# Patient Record
Sex: FEMALE | Race: WHITE | HISPANIC OR LATINO | Employment: PART TIME | URBAN - METROPOLITAN AREA
[De-identification: names, ages, dates, MRNs, and addresses within clinical notes are randomized per-mention and may not be internally consistent; named-entity substitution may affect disease eponyms.]

---
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
## 2021-02-02 DIAGNOSIS — Z30.9 ENCOUNTER FOR CONTRACEPTIVE MANAGEMENT, UNSPECIFIED TYPE: ICD-10-CM

## 2021-02-02 RX ORDER — ACETAMINOPHEN AND CODEINE PHOSPHATE 120; 12 MG/5ML; MG/5ML
SOLUTION ORAL
Qty: 84 TABLET | Refills: 1 | Status: SHIPPED | OUTPATIENT
Start: 2021-02-02 | End: 2021-02-22 | Stop reason: SDUPTHER

## 2021-02-22 DIAGNOSIS — Z30.9 ENCOUNTER FOR CONTRACEPTIVE MANAGEMENT, UNSPECIFIED TYPE: ICD-10-CM

## 2021-02-22 RX ORDER — ACETAMINOPHEN AND CODEINE PHOSPHATE 120; 12 MG/5ML; MG/5ML
1 SOLUTION ORAL DAILY
Qty: 84 TABLET | Refills: 3 | Status: SHIPPED | OUTPATIENT
Start: 2021-02-22 | End: 2021-05-19

## 2021-04-30 ENCOUNTER — OFFICE VISIT (OUTPATIENT)
Dept: OBGYN CLINIC | Facility: CLINIC | Age: 21
End: 2021-04-30

## 2021-04-30 VITALS
BODY MASS INDEX: 20.16 KG/M2 | HEART RATE: 96 BPM | WEIGHT: 121 LBS | HEIGHT: 65 IN | DIASTOLIC BLOOD PRESSURE: 75 MMHG | SYSTOLIC BLOOD PRESSURE: 112 MMHG

## 2021-04-30 DIAGNOSIS — Z30.09 UNWANTED FERTILITY: Primary | ICD-10-CM

## 2021-04-30 PROBLEM — K21.9 GASTROESOPHAGEAL REFLUX DISEASE: Status: RESOLVED | Noted: 2019-11-26 | Resolved: 2021-04-30

## 2021-04-30 PROBLEM — G47.00 INSOMNIA: Status: RESOLVED | Noted: 2019-11-26 | Resolved: 2021-04-30

## 2021-04-30 PROBLEM — N75.0 BARTHOLIN GLAND CYST: Status: RESOLVED | Noted: 2020-07-14 | Resolved: 2021-04-30

## 2021-04-30 PROBLEM — G43.709 CHRONIC MIGRAINE WITHOUT AURA WITHOUT STATUS MIGRAINOSUS, NOT INTRACTABLE: Status: RESOLVED | Noted: 2019-12-23 | Resolved: 2021-04-30

## 2021-04-30 PROBLEM — H53.8 BLURRY VISION, BILATERAL: Status: RESOLVED | Noted: 2019-12-23 | Resolved: 2021-04-30

## 2021-04-30 PROBLEM — B07.0 PLANTAR WART: Status: RESOLVED | Noted: 2020-06-30 | Resolved: 2021-04-30

## 2021-04-30 PROBLEM — Q83.3 ACCESSORY NIPPLE: Status: RESOLVED | Noted: 2019-11-26 | Resolved: 2021-04-30

## 2021-04-30 PROBLEM — R39.9 UTI SYMPTOMS: Status: RESOLVED | Noted: 2020-07-10 | Resolved: 2021-04-30

## 2021-04-30 LAB — SL AMB POCT URINE HCG: NEGATIVE

## 2021-04-30 PROCEDURE — 99213 OFFICE O/P EST LOW 20 MIN: CPT | Performed by: NURSE PRACTITIONER

## 2021-04-30 PROCEDURE — 81025 URINE PREGNANCY TEST: CPT | Performed by: NURSE PRACTITIONER

## 2021-04-30 NOTE — PROGRESS NOTES
PROBLEM GYNECOLOGICAL VISIT    Lidia Szymanski is a 24 y o  female who presents today with complaint of unwanted fertility  Her general medical history has been reviewed and she reports it as follows:    Past Medical History:   Diagnosis Date    No known health problems      Past Surgical History:   Procedure Laterality Date    WISDOM TOOTH EXTRACTION       OB History        0    Para   0    Term   0       0    AB   0    Living   0       SAB   0    TAB   0    Ectopic   0    Multiple   0    Live Births   0               Social History     Tobacco Use    Smoking status: Never Smoker    Smokeless tobacco: Never Used   Substance Use Topics    Alcohol use: Yes     Frequency: Monthly or less     Drinks per session: 3 or 4    Drug use: Not Currently     Types: Marijuana     Comment: last used marijuana 3/2021       Current Outpatient Medications   Medication Instructions    norethindrone (MICRONOR) 0 35 mg, Oral, Daily       History of Present Illness:   Reports has been taking OCP's for over a year  Initially started with combined OCP's but due to side effects of mood instability, she was switched to progesterone only OCP after a few months and has been happy with this method, but finds it difficult at times to remember to take a pill every day  She is interested in switching to a different method of contraception and is inquiring specifically about the Nexplanon  She has 2 friends who are using Nexplanon and both are happy with the therapy  Review of Systems:  Review of Systems   Constitutional: Negative  Gastrointestinal: Negative  Genitourinary: Negative  Physical Exam:  /75   Pulse 96   Ht 5' 5" (1 651 m)   Wt 54 9 kg (121 lb)   LMP 2021 (Exact Date)   BMI 20 14 kg/m²   Physical Exam  Constitutional:       General: She is not in acute distress  Neurological:      Mental Status: She is alert  Skin:     General: Skin is warm and dry     Vitals signs reviewed  Point of Care Testing:   -urine pregnancy test: negative    Assessment:   1  Unwanted fertility  Plan:   1  Reviewed common side effects and risk/benefits of Nexplanon implant  We will obtain insurance authorization and once device is delivered here to clinic we will contact patient to schedule insertion procedure  2  Return to office prn

## 2021-05-11 ENCOUNTER — TELEPHONE (OUTPATIENT)
Dept: OBGYN CLINIC | Facility: CLINIC | Age: 21
End: 2021-05-11

## 2021-05-18 ENCOUNTER — TELEPHONE (OUTPATIENT)
Dept: OBGYN CLINIC | Facility: CLINIC | Age: 21
End: 2021-05-18

## 2021-05-19 ENCOUNTER — PROCEDURE VISIT (OUTPATIENT)
Dept: OBGYN CLINIC | Facility: CLINIC | Age: 21
End: 2021-05-19

## 2021-05-19 VITALS
DIASTOLIC BLOOD PRESSURE: 77 MMHG | SYSTOLIC BLOOD PRESSURE: 121 MMHG | BODY MASS INDEX: 20.3 KG/M2 | HEART RATE: 75 BPM | WEIGHT: 122 LBS

## 2021-05-19 DIAGNOSIS — Z30.09 UNWANTED FERTILITY: ICD-10-CM

## 2021-05-19 DIAGNOSIS — Z30.017 NEXPLANON INSERTION: Primary | ICD-10-CM

## 2021-05-19 LAB — SL AMB POCT URINE HCG: NORMAL

## 2021-05-19 PROCEDURE — 11981 INSERTION DRUG DLVR IMPLANT: CPT | Performed by: NURSE PRACTITIONER

## 2021-05-19 PROCEDURE — 81025 URINE PREGNANCY TEST: CPT | Performed by: NURSE PRACTITIONER

## 2021-05-19 NOTE — PROGRESS NOTES
Universal Protocol:  Consent: Verbal consent obtained  Written consent obtained  Risks and benefits: risks, benefits and alternatives were discussed  Consent given by: patient  Time out: Immediately prior to procedure a "time out" was called to verify the correct patient, procedure, equipment, support staff and site/side marked as required  Patient understanding: patient states understanding of the procedure being performed  Patient consent: the patient's understanding of the procedure matches consent given  Procedure consent: procedure consent matches procedure scheduled  Site marked: the operative site was marked  Required items: required blood products, implants, devices, and special equipment available  Patient identity confirmed: verbally with patient    Remove and insert drug implant    Date/Time: 5/19/2021 2:03 PM  Performed by: CLARI Santiago  Authorized by: CLARI Santiago     Indication:     Indication: Insertion of non-biodegradable drug delivery implant    Pre-procedure:     Pre-procedure timeout performed: yes      Prepped with: alcohol 70% and povidone-iodine      Local anesthetic:  Lidocaine with epinephrine    The site was cleaned and prepped in a sterile fashion: yes    Procedure:     Procedure:   Insertion    Small stab incision was made in arm: yes      Left/right:  Left    Preloaded contraceptive capsule trocar was placed subdermally: yes      Visualization of implant was obtained: yes      Contraceptive capsule was inserted and trocar removed: yes      Visualization of notch in stylet and palpation of device: yes      Palpation confirms placement by provider and patient: yes      Site was closed with steri-strips and pressure bandage applied: yes

## 2021-05-26 ENCOUNTER — IMMUNIZATIONS (OUTPATIENT)
Dept: FAMILY MEDICINE CLINIC | Facility: HOSPITAL | Age: 21
End: 2021-05-26

## 2021-05-26 DIAGNOSIS — Z23 ENCOUNTER FOR IMMUNIZATION: Primary | ICD-10-CM

## 2021-05-26 PROCEDURE — 91300 SARS-COV-2 / COVID-19 MRNA VACCINE (PFIZER-BIONTECH) 30 MCG: CPT

## 2021-05-26 PROCEDURE — 0001A SARS-COV-2 / COVID-19 MRNA VACCINE (PFIZER-BIONTECH) 30 MCG: CPT

## 2021-06-17 ENCOUNTER — IMMUNIZATIONS (OUTPATIENT)
Dept: FAMILY MEDICINE CLINIC | Facility: HOSPITAL | Age: 21
End: 2021-06-17

## 2021-06-17 DIAGNOSIS — Z23 ENCOUNTER FOR IMMUNIZATION: Primary | ICD-10-CM

## 2021-06-17 PROCEDURE — 91300 SARS-COV-2 / COVID-19 MRNA VACCINE (PFIZER-BIONTECH) 30 MCG: CPT | Performed by: PHYSICIAN ASSISTANT

## 2021-06-17 PROCEDURE — 0002A SARS-COV-2 / COVID-19 MRNA VACCINE (PFIZER-BIONTECH) 30 MCG: CPT | Performed by: PHYSICIAN ASSISTANT

## 2021-06-29 DIAGNOSIS — R10.13 EPIGASTRIC PAIN: Primary | ICD-10-CM

## 2021-07-14 ENCOUNTER — OFFICE VISIT (OUTPATIENT)
Dept: FAMILY MEDICINE CLINIC | Facility: CLINIC | Age: 21
End: 2021-07-14

## 2021-07-14 VITALS
BODY MASS INDEX: 20.28 KG/M2 | WEIGHT: 121.7 LBS | DIASTOLIC BLOOD PRESSURE: 66 MMHG | TEMPERATURE: 98.1 F | HEIGHT: 65 IN | RESPIRATION RATE: 16 BRPM | OXYGEN SATURATION: 97 % | HEART RATE: 96 BPM | SYSTOLIC BLOOD PRESSURE: 102 MMHG

## 2021-07-14 DIAGNOSIS — R12 HEARTBURN SYMPTOM: ICD-10-CM

## 2021-07-14 DIAGNOSIS — Z00.00 ANNUAL PHYSICAL EXAM: Primary | ICD-10-CM

## 2021-07-14 PROCEDURE — 99395 PREV VISIT EST AGE 18-39: CPT | Performed by: FAMILY MEDICINE

## 2021-07-14 PROCEDURE — T1015 CLINIC SERVICE: HCPCS | Performed by: FAMILY MEDICINE

## 2021-07-14 RX ORDER — MULTIVITAMIN
1 TABLET ORAL DAILY
COMMUNITY

## 2021-07-14 RX ORDER — OMEPRAZOLE 20 MG/1
20 CAPSULE, DELAYED RELEASE ORAL 2 TIMES DAILY
Qty: 180 CAPSULE | Refills: 0 | Status: SHIPPED | OUTPATIENT
Start: 2021-07-14

## 2021-07-14 NOTE — PATIENT INSTRUCTIONS

## 2021-07-14 NOTE — PROGRESS NOTES
[address] [facility]    NAME: Gian Marin  AGE: 22 y o  SEX: female  : 2000     DATE: 2021     Assessment and Plan:     Problem List Items Addressed This Visit        Other    Heartburn symptom    Relevant Medications    omeprazole (PriLOSEC) 20 mg delayed release capsule      Other Visit Diagnoses     Annual physical exam    -  Primary        Patient has order for H pylori stool test  Has been taking Omeprazole OCT daily with minimal to no improvement, increase to BID  Diet discussed, lifestyle modifications reviewed with patient  Immunizations and preventive care screenings were discussed with patient today  Appropriate education was printed on patient's after visit summary  Counseling:  Dental Health: discussed importance of regular tooth brushing, flossing, and dental visits  Sexual health: discussed sexually transmitted diseases, partner selection, use of condoms, avoidance of unintended pregnancy, and contraceptive alternatives  · Exercise: the importance of regular exercise/physical activity was discussed  Recommend exercise 3-5 times per week for at least 30 minutes  Return in 1 year (on 2022)  Chief Complaint:     Chief Complaint   Patient presents with    Physical Exam     21 y/o     GERD      History of Present Illness:     Adult Annual Physical   Patient here for a comprehensive physical exam  The patient reports problems - heartburn  Diet and Physical Activity  · Diet/Nutrition: well balanced diet  · Exercise: moderate cardiovascular exercise  Depression Screening  PHQ-9 Depression Screening    PHQ-9:   Frequency of the following problems over the past two weeks:           General Health  · Sleep: sleeps well  · Hearing: normal - bilateral   · Vision: most recent eye exam <1 year ago and wears glasses and contacts     · Dental: regular dental visits and brushes teeth twice daily  /GYN Health  · Last menstrual period: 7/11/2021  · Contraceptive method: implanon  · History of STDs?: no      Review of Systems:     Review of Systems   Gastrointestinal:        Heart burn   All other systems reviewed and are negative  Past Medical History:     Past Medical History:   Diagnosis Date    No known health problems       Past Surgical History:     Past Surgical History:   Procedure Laterality Date    WISDOM TOOTH EXTRACTION  2019      Social History:     Social History     Socioeconomic History    Marital status: Single     Spouse name: None    Number of children: None    Years of education: None    Highest education level: None   Occupational History    None   Tobacco Use    Smoking status: Never Smoker    Smokeless tobacco: Never Used   Vaping Use    Vaping Use: Never used   Substance and Sexual Activity    Alcohol use: Yes    Drug use: Not Currently     Types: Marijuana     Comment: last used marijuana 3/2021    Sexual activity: Yes     Partners: Male     Birth control/protection: OCP   Other Topics Concern    None   Social History Narrative    None     Social Determinants of Health     Financial Resource Strain:     Difficulty of Paying Living Expenses:    Food Insecurity:     Worried About Running Out of Food in the Last Year:     Ran Out of Food in the Last Year:    Transportation Needs:     Lack of Transportation (Medical):      Lack of Transportation (Non-Medical):    Physical Activity:     Days of Exercise per Week:     Minutes of Exercise per Session:    Stress:     Feeling of Stress :    Social Connections:     Frequency of Communication with Friends and Family:     Frequency of Social Gatherings with Friends and Family:     Attends Nondenominational Services:     Active Member of Clubs or Organizations:     Attends Club or Organization Meetings:     Marital Status:    Intimate Partner Violence:     Fear of Current or Ex-Partner:     Emotionally Abused:     Physically Abused:     Sexually Abused:       Family History:     Family History   Problem Relation Age of Onset    Hypertension Maternal Grandmother     Hyperlipidemia Maternal Grandmother     Hypertension Paternal Grandmother     Hyperlipidemia Paternal Grandmother     Breast cancer Neg Hx     Cancer Neg Hx       Current Medications:     Current Outpatient Medications   Medication Sig Dispense Refill    etonogestrel (NEXPLANON) subdermal implant 68 mg by Subdermal route once for 1 dose 1 each 0    Multiple Vitamin (multivitamin) tablet Take 1 tablet by mouth daily      omeprazole (PriLOSEC) 20 mg delayed release capsule Take 1 capsule (20 mg total) by mouth 2 (two) times a day 180 capsule 0     No current facility-administered medications for this visit  Allergies:     No Known Allergies   Physical Exam:     /66 (BP Location: Right arm, Patient Position: Sitting, Cuff Size: Standard)   Pulse 96   Temp 98 1 °F (36 7 °C) (Temporal)   Resp 16   Ht 5' 5" (1 651 m)   Wt 55 2 kg (121 lb 11 2 oz)   LMP 07/14/2021 (Exact Date) Comment: currently on it   SpO2 97%   BMI 20 25 kg/m²     Physical Exam  Vitals and nursing note reviewed  Constitutional:       General: She is not in acute distress  Appearance: She is well-developed  HENT:      Head: Normocephalic and atraumatic  Eyes:      Conjunctiva/sclera: Conjunctivae normal    Cardiovascular:      Rate and Rhythm: Normal rate and regular rhythm  Heart sounds: No murmur heard  Pulmonary:      Effort: Pulmonary effort is normal  No respiratory distress  Breath sounds: Normal breath sounds  Abdominal:      Palpations: Abdomen is soft  Tenderness: There is no abdominal tenderness  Musculoskeletal:      Cervical back: Neck supple  Skin:     General: Skin is warm and dry  Neurological:      Mental Status: She is alert            MD Sulema Joseph

## 2021-07-29 DIAGNOSIS — R12 HEARTBURN SYMPTOM: ICD-10-CM

## 2021-07-29 DIAGNOSIS — R10.13 EPIGASTRIC PAIN: Primary | ICD-10-CM

## 2021-08-06 ENCOUNTER — PROCEDURE VISIT (OUTPATIENT)
Dept: OBGYN CLINIC | Facility: CLINIC | Age: 21
End: 2021-08-06

## 2021-08-06 VITALS
DIASTOLIC BLOOD PRESSURE: 73 MMHG | BODY MASS INDEX: 20.33 KG/M2 | HEIGHT: 65 IN | HEART RATE: 97 BPM | WEIGHT: 122 LBS | SYSTOLIC BLOOD PRESSURE: 105 MMHG

## 2021-08-06 DIAGNOSIS — N75.0 BARTHOLIN CYST: ICD-10-CM

## 2021-08-06 DIAGNOSIS — N94.89 LABIAL SWELLING: Primary | ICD-10-CM

## 2021-08-06 PROCEDURE — 99213 OFFICE O/P EST LOW 20 MIN: CPT | Performed by: OBSTETRICS & GYNECOLOGY

## 2021-08-06 PROCEDURE — T1015 CLINIC SERVICE: HCPCS | Performed by: OBSTETRICS & GYNECOLOGY

## 2021-08-06 NOTE — ASSESSMENT & PLAN NOTE
Left sided labial swelling x 2 days  This is the 3rd episode, others resolved following drainage  On exam, 1x2cm left sided swelling on labia majora, nontender, nonerythematous - bartholin cyst vs inclusion cyst  Warm compress applied  Will follow up in 10 days

## 2021-08-06 NOTE — PROGRESS NOTES
OFFICE VISIT NOTE - [facility] [address] [facility] 22 y o  (:2000) female MRN: [id number]  Unit/Bed#:  Encounter: [id number]      Date: 21    Assessment and Plan     Labial swelling  Left sided labial swelling x 2 days  This is the 3rd episode, others resolved following drainage  On exam, 1x2cm left sided swelling on labia majora, nontender, nonerythematous - bartholin cyst vs inclusion cyst  Warm compress applied  Will follow up in 10 days  Subjective:  Chief Complaint   Patient presents with    left labia cyst     x 2 days       History of Present Illness     Wells Minors is a 22 y o  female who is here for a left-sided, mildly painful labial swelling x 2 days  Patient denies any hx of trauma or injury  This has happened before and this is the 3rd episode  Previously, she has had it drained for resolution  The most recent occurred about a year ago  She is sexually active with one male partner and never had an STI  Denies systemic sx such as fever, headache, malaise  Also denies VD, VB, chest pain, SOB, n/v or pelvic pain  PMH is only sig for GERD for which she takes omeprazole  The following portions of the patient's history were reviewed and updated as appropriate: allergies, current medications, past family history, past medical history, past social history, past surgical history and problem list     Review of Systems     Review of Systems   Constitutional: Negative for chills, fatigue and fever  HENT: Negative for sore throat  Eyes: Negative for photophobia, redness and visual disturbance  Respiratory: Negative for cough, shortness of breath and wheezing  Cardiovascular: Negative for chest pain, palpitations and leg swelling  Gastrointestinal: Negative for abdominal distention, abdominal pain, blood in stool, constipation, diarrhea, nausea and vomiting  Musculoskeletal: Negative for arthralgias, back pain and neck pain     Skin: Negative for rash    Neurological: Negative for dizziness, weakness, numbness and headaches  Current Medications     Current Outpatient Medications on File Prior to Visit   Medication Sig Dispense Refill    Multiple Vitamin (multivitamin) tablet Take 1 tablet by mouth daily      omeprazole (PriLOSEC) 20 mg delayed release capsule Take 1 capsule (20 mg total) by mouth 2 (two) times a day 180 capsule 0    etonogestrel (NEXPLANON) subdermal implant 68 mg by Subdermal route once for 1 dose 1 each 0     No current facility-administered medications on file prior to visit  Objective     Vitals: /73   Pulse 97   Ht 5' 5" (1 651 m)   Wt 55 3 kg (122 lb)   LMP 07/14/2021 (Exact Date) Comment: currently on it   BMI 20 30 kg/m²     Physical Exam  Constitutional:       General: She is not in acute distress  Appearance: She is not ill-appearing  HENT:      Head: Normocephalic and atraumatic  Right Ear: External ear normal       Left Ear: External ear normal       Nose: Nose normal  No congestion or rhinorrhea  Eyes:      Conjunctiva/sclera: Conjunctivae normal    Cardiovascular:      Rate and Rhythm: Normal rate and regular rhythm  Heart sounds: Normal heart sounds  Pulmonary:      Effort: No respiratory distress  Breath sounds: Normal breath sounds  No wheezing  Abdominal:      General: Bowel sounds are normal  There is no distension  Palpations: Abdomen is soft  There is no mass  Tenderness: There is no abdominal tenderness  Genitourinary:     General: Normal vulva  Exam position: Lithotomy position  Pubic Area: No rash  Labia:         Right: No rash, tenderness, lesion or injury  Left: Tenderness and lesion present  No rash or injury  Comments: Left-sided labial swelling extending abt 1x2cm  Mildly tender, slightly warm, nonfluctuant, nonerythematous and not draining  Musculoskeletal:         General: No swelling, tenderness or deformity  Normal range of motion  Right lower leg: No edema  Left lower leg: No edema  Skin:     General: Skin is warm  Findings: No rash  Neurological:      Mental Status: She is alert and oriented to person, place, and time     Psychiatric:         Behavior: Behavior normal        FUTURE CONFIRMED APPOINTMENTS:  Future Appointments   Date Time Provider Elaine Dhillon   8/11/2021  8:40 AM Aelx Bush MD [facility]   6/20/2022  8:30 AM CLARI Child [address] [facility]       Hemanth Isaac MD  Family Medicine PGY-2  08/06/21

## 2021-08-07 ENCOUNTER — HOSPITAL ENCOUNTER (EMERGENCY)
Facility: HOSPITAL | Age: 21
Discharge: HOME | End: 2021-08-07
Attending: EMERGENCY MEDICINE | Admitting: EMERGENCY MEDICINE
Payer: COMMERCIAL

## 2021-08-07 ENCOUNTER — OFFICE VISIT (OUTPATIENT)
Dept: URGENT CARE | Facility: CLINIC | Age: 21
End: 2021-08-07
Payer: COMMERCIAL

## 2021-08-07 VITALS
RESPIRATION RATE: 18 BRPM | DIASTOLIC BLOOD PRESSURE: 61 MMHG | TEMPERATURE: 98.6 F | WEIGHT: 122 LBS | OXYGEN SATURATION: 99 % | HEIGHT: 65 IN | SYSTOLIC BLOOD PRESSURE: 122 MMHG | HEART RATE: 94 BPM | BODY MASS INDEX: 20.33 KG/M2

## 2021-08-07 VITALS
DIASTOLIC BLOOD PRESSURE: 86 MMHG | TEMPERATURE: 98.5 F | HEIGHT: 65 IN | RESPIRATION RATE: 18 BRPM | WEIGHT: 125 LBS | OXYGEN SATURATION: 100 % | HEART RATE: 102 BPM | BODY MASS INDEX: 20.83 KG/M2 | SYSTOLIC BLOOD PRESSURE: 126 MMHG

## 2021-08-07 DIAGNOSIS — N75.0 BARTHOLIN GLAND CYST: Primary | ICD-10-CM

## 2021-08-07 PROCEDURE — 56420 I&D BARTHOLINS GLAND ABSCESS: CPT | Performed by: EMERGENCY MEDICINE

## 2021-08-07 PROCEDURE — 99213 OFFICE O/P EST LOW 20 MIN: CPT | Performed by: NURSE PRACTITIONER

## 2021-08-07 PROCEDURE — 99282 EMERGENCY DEPT VISIT SF MDM: CPT

## 2021-08-07 PROCEDURE — 99284 EMERGENCY DEPT VISIT MOD MDM: CPT | Performed by: EMERGENCY MEDICINE

## 2021-08-07 RX ORDER — LIDOCAINE HYDROCHLORIDE 10 MG/ML
1 INJECTION, SOLUTION EPIDURAL; INFILTRATION; INTRACAUDAL; PERINEURAL ONCE
Status: COMPLETED | OUTPATIENT
Start: 2021-08-07 | End: 2021-08-07

## 2021-08-07 RX ADMIN — LIDOCAINE HYDROCHLORIDE 1 ML: 10 INJECTION, SOLUTION EPIDURAL; INFILTRATION; INTRACAUDAL; PERINEURAL at 12:07

## 2021-08-07 NOTE — DISCHARGE INSTRUCTIONS
You were seen in the Emergency Department today for a Bartholin's Gland abscess    We performed an incision and drainage of the abscess here in the ED  We also placed a Word catheter to keep the abscess pocket open to continue draining and healing  These typically stay in place for 4-6 weeks, however it may fall out on its own  Please follow-up with your obgyn as soon as possible to have this evaluated  They may recommend a shorter time frame  You may see blood and pus drain from the catheter site at first  You may use sanitary pads but avoid tampons  You can bathe and shower as you normally would but please avoid bubble baths or oils  Continue to take Advil and tylenol for pain at home  Please follow up with your ob/gyn in 2-3 days  Please return to the Emergency Department if you experience worsening of your current symptoms, any signs of infection such as fever, severe pain, extension of the redness, or any new or concerning symptoms

## 2021-08-07 NOTE — PROGRESS NOTES
[facility]        NAME: Brady Smith is a 22 y o  female  : 2000    MRN: [id number]  DATE: 2021  TIME: 10:33 AM    Assessment and Plan   Bartholin gland cyst [N75 0]  1  Bartholin gland cyst       Pt seen yesterday at OBGYN - symptoms worsening  Exam confirmed bartholin's   Discussed we do not drain nor insert catheters for draining the Bartholin cysts in this office - referred to ED -   Discussed with pt as this is the 3rd time she has a bartholin's cyst I do recommend she discuss possible marsupialization with her OBGYN  Pt and mother in agreement with plan  Patient Instructions     Follow up with PCP in 3-5 days  Proceed to  ER if symptoms worsen  Chief Complaint     Chief Complaint   Patient presents with    labial swelling     pt was seen at [facility] yesterday for labial swelling, accorinding to pt is worse today  pt has been doing warm compresses  very painful and slightly bigger  advil has been taken, no open or draiange areas at this time  no fevers or cold symptoms          History of Present Illness   Brady Smith presents to the clinic c/o    pt was seen at [facility] yesterday for labial swelling, accorinding to pt is worse today  pt has been doing warm compresses  very painful and slightly bigger  advil has been taken, no open or draiange areas at this time  no fevers or cold symptoms       Review of Systems   Review of Systems   All other systems reviewed and are negative          Current Medications     Long-Term Medications   Medication Sig Dispense Refill    omeprazole (PriLOSEC) 20 mg delayed release capsule Take 1 capsule (20 mg total) by mouth 2 (two) times a day 180 capsule 0    etonogestrel (NEXPLANON) subdermal implant 68 mg by Subdermal route once for 1 dose 1 each 0       Current Allergies     Allergies as of 2021    (No Known Allergies)            The following portions of the patient's history were reviewed and updated as appropriate: allergies, current medications, past family history, past medical history, past social history, past surgical history and problem list     Objective   /61   Pulse 94   Temp 98 6 °F (37 °C) (Tympanic)   Resp 18   Ht 5' 5" (1 651 m)   Wt 55 3 kg (122 lb)   LMP 07/14/2021 (Exact Date) Comment: currently on it   SpO2 99%   BMI 20 30 kg/m²        Physical Exam     Physical Exam  Vitals and nursing note reviewed  Exam conducted with a chaperone present  Constitutional:       Appearance: She is well-developed  HENT:      Head: Normocephalic and atraumatic  Eyes:      General: Lids are normal       Conjunctiva/sclera: Conjunctivae normal    Cardiovascular:      Rate and Rhythm: Normal rate and regular rhythm  Heart sounds: Normal heart sounds, S1 normal and S2 normal    Pulmonary:      Effort: Pulmonary effort is normal       Breath sounds: Normal breath sounds  Genitourinary:     Labia:         Left: Tenderness present  Skin:     General: Skin is warm and dry  Neurological:      Mental Status: She is alert and oriented to person, place, and time  Psychiatric:         Speech: Speech normal          Behavior: Behavior normal  Behavior is cooperative  Thought Content:  Thought content normal          Judgment: Judgment normal

## 2021-08-07 NOTE — ED ATTENDING ATTESTATION
8/7/2021  IMykel MD, saw and evaluated the patient  I have discussed the patient with the resident/non-physician practitioner and agree with the resident's/non-physician practitioner's findings, Plan of Care, and MDM as documented in the resident's/non-physician practitioner's note, except where noted  All available labs and Radiology studies were reviewed  I was present for key portions of any procedure(s) performed by the resident/non-physician practitioner and I was immediately available to provide assistance  At this point I agree with the current assessment done in the Emergency Department  I have conducted an independent evaluation of this patient a history and physical is as follows:  Patient has a history of 2 episodes in the past of Bartholin's cysts that were drained  The patient now presents with a 4 day history of left labial swelling  The patient denies any vaginal discharge, fever, or other complaints  One the patient denies abdominal pain  Physical exam demonstrates a female no acute distress  HEENT exam is normal   The abdomen is soft and nontender  There was a large fluctuant cyst in the left labia without erythema or warmth    Remainder of the external genitalia were normal   ED Course         Critical Care Time  Procedures

## 2021-08-07 NOTE — PATIENT INSTRUCTIONS
Bartholin Cyst   AMBULATORY CARE:   A Bartholin cyst  is a lump near the opening to your vagina  You may have pain in this area when you walk or have sex  A Bartholin cyst is caused by blockage of your Bartholin gland  You have a Bartholin gland on each side of your vagina  The glands produce fluid to moisten your vagina  Over time the fluid can build up in the gland and form a cyst  The cyst may become infected  You may be at risk for a Bartholin cyst if you have a sexually transmitted infection  An injury or surgery near your vagina may also increase you risk  Contact your gynecologist or healthcare provider if:   · You have a fever  · Your cyst gets larger or becomes more painful  · Your cyst returns after treatment  · Your drain falls out  · You have pus, redness, or swelling where the cyst was drained  · You have questions or concerns about your condition or care  Treatment for a Bartholin cyst  may include treatment at home  Instead, you may need any of the following:  · Medicine  may be given to treat or prevent an infection  Medicine may also be given to decrease pain and swelling  · Incision and drainage  is a procedure to drain the cyst  Your healthcare provider will make an opening in the cyst so it can drain  He or she may also place packing or a drain in your wound  The drain will help keep your gland open and drain extra fluid  The packing will be removed in 24 to 48 hours  The drain may be left in place for 4 to 6 weeks  · Surgery  - marsupialization - may be needed if other treatments do not work  Surgery may be done to hold your gland open and prevent another blockage  Surgery may also be done to remove 1 or both of your Bartholin glands  Self-care if your cyst was not drained:   · Take a sitz bath  3 to 4 times each day or as directed  A sitz bath may help relieve swelling and pain  It will also help open your Bartholin glands so they drain normally   Place a clean towel on the bottom of your bath tub  Fill your bath tub with warm water up to your hips  You can also buy a sitz bath that fits in your toilet  Sit in the water for 10 minutes  · Apply a warm compress  to your cyst  This may relieve swelling and pain  A warm compress will also help open your Bartholin glands so they drain normally  Wet a washcloth in warm, but not hot, water  Apply the compress for 10 minutes  Repeat 4 times each day  · Keep the area around your vagina clean  Always wipe front to back  Shower once a day  Gently pat the area dry after a shower  Self-care after an incision and drainage of your cyst:   · Take a sitz bath  in 24 to 48 hours or as directed  You may need to wait to take a sitz bath until after your packing is removed  A sitz bath may help relieve swelling and pain  Take a sitz bath 3 to 4 times each day for 3 days  Place a clean towel on the bottom of your bath tub  Fill your bath tub with warm water up to your hips  You can also buy a sitz bath that fits in your toilet  Sit in the water for 10 minutes  · Wear a sanitary pad  to absorb drainage from your wound  You may have drainage for a few weeks after your cyst is drained  · Ask your healthcare provider if it is okay to have sex  Sex may cause your drain to fall out  It may also increase your risk for an infection  · Keep the area around your vagina clean  Always wipe front to back  Shower once a day  Gently pat the area dry after a shower  Follow up with your healthcare provider as directed: If packing was placed in your wound, return in 24 to 48 hours to have it removed  If a drain was placed, you will need to return in 4 to 6 weeks to have it removed  Write down your questions so you remember to ask them during your visits  © Copyright [employer] 2021 Information is for End User's use only and may not be sold, redistributed or otherwise used for commercial purposes   All illustrations and images included in [employer] are the copyrighted property of A [employer] M , Inc  or [facility] Janie Hawk   The above information is an  only  It is not intended as medical advice for individual conditions or treatments  Talk to your doctor, nurse or pharmacist before following any medical regimen to see if it is safe and effective for you

## 2021-08-07 NOTE — ED PROVIDER NOTES
History  Chief Complaint   Patient presents with    Cyst     Bartholin cyst L sided, pt reports hx of similar which needed to be drained  Pt states pain started 3-4 days ago  21 yo F PMHx of two previous Bartholin's gland abscess presents from urgent care office with pain and swelling of the labia  Pain began four days ago and has been increasing in severity  Patient states that she went to see her ob/gyn yesterday who diagnosed her with a Bartholin's gland cyst, but felt it was not large enough to incise and drain  Patient has been taking Advil and Tylenol for pain, and has taken numerous Sitz baths without relief  Overnight, the pain and swelling has increased in severity so she presented ot urgent care this morning for drainage  They did not feel comfortable with performing the procedure so the sent her to the ED  Patient states that she has had two prios abscess which have been drained outpatient at a women's health clinic  After the first I&D, she required antibiotics but not after the second  Both times, placement of a word catheter was attempted without success  She has discussed the option of marsupialization with her ob/gyn and is currently not interested in moving forward with that  She is in moderate pain on presentation, not complaining of any other symptoms  She has not had any vaginal bleeding or spotting  No fever, chills, cough, nausea, vomiting, chest pain  Prior to Admission Medications   Prescriptions Last Dose Informant Patient Reported? Taking?    Multiple Vitamin (multivitamin) tablet  Self Yes No   Sig: Take 1 tablet by mouth daily   etonogestrel (NEXPLANON) subdermal implant   No No   Si mg by Subdermal route once for 1 dose   omeprazole (PriLOSEC) 20 mg delayed release capsule   No No   Sig: Take 1 capsule (20 mg total) by mouth 2 (two) times a day      Facility-Administered Medications: None       Past Medical History:   Diagnosis Date    No known health problems Past Surgical History:   Procedure Laterality Date    WISDOM TOOTH EXTRACTION  2019       Family History   Problem Relation Age of Onset    Hypertension Maternal Grandmother     Hyperlipidemia Maternal Grandmother     Hypertension Paternal Grandmother     Hyperlipidemia Paternal Grandmother     Breast cancer Neg Hx     Cancer Neg Hx      I have reviewed and agree with the history as documented  E-Cigarette/Vaping    E-Cigarette Use Never User      E-Cigarette/Vaping Substances     Social History     Tobacco Use    Smoking status: Never Smoker    Smokeless tobacco: Never Used   Vaping Use    Vaping Use: Never used   Substance Use Topics    Alcohol use: Yes    Drug use: Not Currently     Types: Marijuana     Comment: last used marijuana 3/2021        Review of Systems   Constitutional: Negative for chills and fever  HENT: Negative for ear pain and sore throat  Eyes: Negative for pain and visual disturbance  Respiratory: Negative for cough and shortness of breath  Cardiovascular: Negative for chest pain and palpitations  Gastrointestinal: Negative for abdominal pain and vomiting  Genitourinary: Positive for vaginal pain  Negative for decreased urine volume, dysuria, hematuria, menstrual problem, vaginal bleeding and vaginal discharge  Musculoskeletal: Negative for arthralgias and back pain  Skin: Negative for color change and rash  Neurological: Negative for seizures and syncope  All other systems reviewed and are negative        Physical Exam  ED Triage Vitals [08/07/21 1105]   Temperature Pulse Respirations Blood Pressure SpO2   98 5 °F (36 9 °C) 102 18 126/86 100 %      Temp Source Heart Rate Source Patient Position - Orthostatic VS BP Location FiO2 (%)   Oral Monitor Sitting Right arm --      Pain Score       Worst Possible Pain             Orthostatic Vital Signs  Vitals:    08/07/21 1105   BP: 126/86   Pulse: 102   Patient Position - Orthostatic VS: Sitting Physical Exam  Vitals and nursing note reviewed  Constitutional:       General: She is not in acute distress  Appearance: She is well-developed  HENT:      Head: Normocephalic and atraumatic  Eyes:      Conjunctiva/sclera: Conjunctivae normal    Cardiovascular:      Rate and Rhythm: Normal rate and regular rhythm  Heart sounds: No murmur heard  Pulmonary:      Effort: Pulmonary effort is normal  No respiratory distress  Breath sounds: Normal breath sounds  Abdominal:      Palpations: Abdomen is soft  Tenderness: There is no abdominal tenderness  Genitourinary:         Comments: 4 5-5 cm fluctuant swelling at the left lower labia  Musculoskeletal:      Cervical back: Neck supple  Skin:     General: Skin is warm and dry  Neurological:      Mental Status: She is alert  Psychiatric:         Mood and Affect: Mood normal          Behavior: Behavior normal          ED Medications  Medications   lidocaine (PF) (XYLOCAINE-MPF) 1 % injection 1 mL (1 mL Infiltration Given by Other 8/7/21 3501)       Diagnostic Studies  Results Reviewed     None                 No orders to display         Procedures  Incision and drain    Date/Time: 8/7/2021 2:42 PM  Performed by: Amparo Hoffman MD  Authorized by: Amparo Hoffman MD   Universal Protocol:  Procedure performed by:  Consent: Verbal consent obtained  Risks and benefits: risks, benefits and alternatives were discussed  Consent given by: patient  Patient understanding: patient states understanding of the procedure being performed  Patient consent: the patient's understanding of the procedure matches consent given  Procedure consent: procedure consent matches procedure scheduled  Relevant documents: relevant documents present and verified  Test results: test results not available  Site marked: the operative site was marked  Radiology Images displayed and confirmed   If images not available, report reviewed: imaging studies not available  Patient identity confirmed: verbally with patient      Patient location:  ED  Location:     Type:  Bartholin cyst    Location:  Anogenital    Anogenital location:  Bartholin's gland  Anesthesia (see MAR for exact dosages): Anesthesia method:  Local infiltration    Local anesthetic:  Lidocaine 1% w/o epi  Procedure details:     Complexity:  Simple    Needle aspiration: no      Incision types:  Stab incision    Aspiration type: puncture aspiration      Scalpel blade:  11    Approach:  Puncture    Incision depth:  Subcutaneous    Wound management:  Probed and deloculated    Drainage:  Purulent    Drainage amount:  Copious    Wound treatment:  Drain placed    Packing materials: Word catheter  Post-procedure details:     Patient tolerance of procedure: Tolerated well, no immediate complications  Comments: Word catheter placed into 1 cm puncture incision and inflated with 1 5ml of saline  Catheter tucked into the vagina  Tolerated well  ED Course                                       MDM  Number of Diagnoses or Management Options  Bartholin gland cyst  Diagnosis management comments: 21 yo F PMHx of two previous Bartholin's gland abscess presents from urgent care office with pain and swelling of the labia  Area examined and region of superficial fluctuance for I&D identified  I&D preformed with extensive exploration and de-loculation of the abscess  Word catheter placed into 1cm incision and inflated with 1 5ml of normal saline  Tail of word catheter tucked into the vagina  Area cleaned, bleeding from incision controlled  Patient instructed to follow-up with obgyn on Monday for further management and check of the area  Instructions for bathing and showering given  Patient discharged home with obgyn follow-up           Disposition  Final diagnoses:   Bartholin gland cyst     Time reflects when diagnosis was documented in both MDM as applicable and the Disposition within this note     Time User Action Codes Description Comment    8/7/2021 12:34 PM Malinda Epstein Add [N75 0] Bartholin gland cyst       ED Disposition     ED Disposition Condition Date/Time Comment    Discharge Stable Sat Aug 7, 2021 12:34 PM Krista Person discharge to home/self care  Follow-up Information     Follow up With Specialties Details Why Contact Alejandrina Sterling MD Family Medicine In 2 days  [address]  [address] [facility]  [place] [address] [facility]  [phone]            Discharge Medication List as of 8/7/2021 12:45 PM      CONTINUE these medications which have NOT CHANGED    Details   etonogestrel (NEXPLANON) subdermal implant 68 mg by Subdermal route once for 1 dose, Starting Wed 5/19/2021, No Print      Multiple Vitamin (multivitamin) tablet Take 1 tablet by mouth daily, Historical Med      omeprazole (PriLOSEC) 20 mg delayed release capsule Take 1 capsule (20 mg total) by mouth 2 (two) times a day, Starting Wed 7/14/2021, Normal           No discharge procedures on file  PDMP Review     None           ED Provider  Attending physically available and evaluated Krista Person I managed the patient along with the ED Attending      Electronically Signed by         Subhash Hopkins MD  08/07/21 4693

## 2021-08-11 ENCOUNTER — OFFICE VISIT (OUTPATIENT)
Dept: GASTROENTEROLOGY | Facility: AMBULARY SURGERY CENTER | Age: 21
End: 2021-08-11
Payer: COMMERCIAL

## 2021-08-11 VITALS
WEIGHT: 127 LBS | BODY MASS INDEX: 21.16 KG/M2 | HEIGHT: 65 IN | DIASTOLIC BLOOD PRESSURE: 72 MMHG | SYSTOLIC BLOOD PRESSURE: 118 MMHG

## 2021-08-11 DIAGNOSIS — K21.9 GASTROESOPHAGEAL REFLUX DISEASE WITHOUT ESOPHAGITIS: Primary | ICD-10-CM

## 2021-08-11 PROCEDURE — 99244 OFF/OP CNSLTJ NEW/EST MOD 40: CPT | Performed by: INTERNAL MEDICINE

## 2021-08-11 RX ORDER — CALCIUM CARBONATE 200(500)MG
1 TABLET,CHEWABLE ORAL DAILY
COMMUNITY

## 2021-08-11 RX ORDER — FAMOTIDINE 20 MG/1
20 TABLET, FILM COATED ORAL 2 TIMES DAILY
Qty: 60 TABLET | Refills: 11 | Status: SHIPPED | OUTPATIENT
Start: 2021-08-11

## 2021-08-11 NOTE — PROGRESS NOTES
Consultation - [facility]) [facility]  Ernestosesar Aram 2000 female         Chief Complaint:  GERD    HPI:   31-year-old female with no significant past medical history reports having acid reflux symptoms in the past year  She takes Prilosec as intermittent courses with help  Denies any specific triggering foods  Good appetite, no recent weight loss  Denies any difficulty swallowing  No abdominal pain, nausea or vomiting  Regular bowel movements and denies any blood or mucus in the stool  Denies any NSAID use  REVIEW OF SYSTEMS: Review of Systems   Constitutional: Negative for activity change, appetite change, chills, diaphoresis, fatigue, fever and unexpected weight change  HENT: Negative for ear discharge, ear pain, facial swelling, hearing loss, nosebleeds, sore throat, tinnitus and voice change  Eyes: Negative for pain, discharge, redness, itching and visual disturbance  Respiratory: Negative for apnea, cough, chest tightness, shortness of breath and wheezing  Cardiovascular: Negative for chest pain and palpitations  Gastrointestinal:        As noted in HPI   Endocrine: Negative for cold intolerance, heat intolerance and polyuria  Genitourinary: Negative for difficulty urinating, dysuria, flank pain, hematuria and urgency  Musculoskeletal: Negative for arthralgias, back pain, gait problem, joint swelling and myalgias  Skin: Negative for rash and wound  Neurological: Negative for dizziness, tremors, seizures, speech difficulty, light-headedness, numbness and headaches  Hematological: Negative for adenopathy  Does not bruise/bleed easily  Psychiatric/Behavioral: Negative for agitation, behavioral problems and confusion  The patient is not nervous/anxious           Past Medical History:   Diagnosis Date    No known health problems       Past Surgical History:   Procedure Laterality Date    WISDOM TOOTH EXTRACTION  2019     Social History     Socioeconomic History    Marital status: Single     Spouse name: Not on file    Number of children: Not on file    Years of education: Not on file    Highest education level: Not on file   Occupational History    Not on file   Tobacco Use    Smoking status: Never Smoker    Smokeless tobacco: Never Used   Vaping Use    Vaping Use: Never used   Substance and Sexual Activity    Alcohol use: Yes     Comment: Rare     Drug use: Not Currently     Types: Marijuana     Comment: last used marijuana 3/2021    Sexual activity: Yes     Partners: Male     Birth control/protection: OCP   Other Topics Concern    Not on file   Social History Narrative    Not on file     Social Determinants of Health     Financial Resource Strain:     Difficulty of Paying Living Expenses:    Food Insecurity:     Worried About Running Out of Food in the Last Year:     [address] in the Last Year:    Transportation Needs:     Lack of Transportation (Medical):  Lack of Transportation (Non-Medical):    Physical Activity:     Days of Exercise per Week:     Minutes of Exercise per Session:    Stress:     Feeling of Stress :    Social Connections:     Frequency of Communication with Friends and Family:     Frequency of Social Gatherings with Friends and Family:     Attends [employer] Services:     Active Member of Clubs or Organizations:     Attends Club or Organization Meetings:     Marital Status:    Intimate Partner Violence:     Fear of Current or Ex-Partner:     Emotionally Abused:     Physically Abused:     Sexually Abused:      Family History   Problem Relation Age of Onset    Hypertension Maternal Grandmother     Hyperlipidemia Maternal Grandmother     Hypertension Paternal Grandmother     Hyperlipidemia Paternal Grandmother     Breast cancer Neg Hx     Cancer Neg Hx      Patient has no known allergies    Current Outpatient Medications   Medication Sig Dispense Refill    calcium carbonate (TUMS) 500 mg chewable tablet Chew 1 tablet daily      Multiple Vitamin (multivitamin) tablet Take 1 tablet by mouth daily      omeprazole (PriLOSEC) 20 mg delayed release capsule Take 1 capsule (20 mg total) by mouth 2 (two) times a day 180 capsule 0    etonogestrel (NEXPLANON) subdermal implant 68 mg by Subdermal route once for 1 dose 1 each 0    famotidine (PEPCID) 20 mg tablet Take 1 tablet (20 mg total) by mouth 2 (two) times a day 60 tablet 11     No current facility-administered medications for this visit  Blood pressure 118/72, height 5' 5" (1 651 m), weight 57 6 kg (127 lb), last menstrual period 07/14/2021  PHYSICAL EXAM: Physical Exam  Constitutional:       Appearance: She is well-developed  HENT:      Head: Normocephalic and atraumatic  Nose: Nose normal    Eyes:      General: No scleral icterus  Right eye: No discharge  Left eye: No discharge  Conjunctiva/sclera: Conjunctivae normal    Neck:      Thyroid: No thyromegaly  Vascular: No JVD  Trachea: No tracheal deviation  Cardiovascular:      Rate and Rhythm: Normal rate and regular rhythm  Heart sounds: Normal heart sounds  No murmur heard  No friction rub  No gallop  Pulmonary:      Effort: Pulmonary effort is normal  No respiratory distress  Breath sounds: Normal breath sounds  No wheezing or rales  Chest:      Chest wall: No tenderness  Abdominal:      General: Bowel sounds are normal  There is no distension  Palpations: Abdomen is soft  There is no mass  Tenderness: There is no abdominal tenderness  There is no guarding or rebound  Hernia: No hernia is present  Musculoskeletal:         General: No tenderness or deformity  Cervical back: Neck supple  Lymphadenopathy:      Cervical: No cervical adenopathy  Skin:     General: Skin is warm and dry  Findings: No erythema or rash  Neurological:      Mental Status: She is alert and oriented to person, place, and time     Psychiatric: Behavior: Behavior normal          Thought Content: Thought content normal           No results found for: WBC, HGB, HCT, MCV, PLT  No results found for: GLUCOSE, CALCIUM, NA, K, CO2, CL, BUN, CREATININE  No results found for: ALT, AST, GGT, ALKPHOS, BILITOT  No results found for: INR, PROTIME    No results found  ASSESSMENT & PLAN:    Gastroesophageal reflux disease without esophagitis    Gastroesophageal reflux disease - Patient has the symptoms of chronic acid reflux for a long time  Possible hiatal hernia or LES weakness  Should rule out Olvera's esophagus because of chronic symptoms         -Patient was explained about the lifestyle and dietary modifications  Advised to avoid fatty foods, chocolates, caffeine, alcohol and any other triggering foods  Avoid eating for at least 3 hours before going to bed  -  Schedule for EGD    - discussed about the long-term side effects from Prilosec and advised her to take Pepcid instead    - Patient was explained about  the risks and benefits of the procedure  Risks including but not limited to bleeding, infection, perforation were explained in detail  Also explained about less than 100% sensitivity with the exam and other alternatives

## 2021-08-11 NOTE — ASSESSMENT & PLAN NOTE
Gastroesophageal reflux disease - Patient has the symptoms of chronic acid reflux for a long time  Possible hiatal hernia or LES weakness  Should rule out Olvera's esophagus because of chronic symptoms         -Patient was explained about the lifestyle and dietary modifications  Advised to avoid fatty foods, chocolates, caffeine, alcohol and any other triggering foods  Avoid eating for at least 3 hours before going to bed  -  Schedule for EGD    - discussed about the long-term side effects from Prilosec and advised her to take Pepcid instead    - Patient was explained about  the risks and benefits of the procedure  Risks including but not limited to bleeding, infection, perforation were explained in detail  Also explained about less than 100% sensitivity with the exam and other alternatives

## 2022-03-01 ENCOUNTER — OFFICE VISIT (OUTPATIENT)
Dept: URGENT CARE | Facility: MEDICAL CENTER | Age: 22
End: 2022-03-01
Payer: COMMERCIAL

## 2022-03-01 ENCOUNTER — APPOINTMENT (OUTPATIENT)
Dept: RADIOLOGY | Facility: MEDICAL CENTER | Age: 22
End: 2022-03-01
Payer: COMMERCIAL

## 2022-03-01 VITALS
TEMPERATURE: 99.3 F | WEIGHT: 127 LBS | HEART RATE: 87 BPM | RESPIRATION RATE: 18 BRPM | DIASTOLIC BLOOD PRESSURE: 68 MMHG | SYSTOLIC BLOOD PRESSURE: 112 MMHG | OXYGEN SATURATION: 100 % | BODY MASS INDEX: 21.13 KG/M2

## 2022-03-01 DIAGNOSIS — R06.02 SOB (SHORTNESS OF BREATH): Primary | ICD-10-CM

## 2022-03-01 DIAGNOSIS — R06.02 SOB (SHORTNESS OF BREATH): ICD-10-CM

## 2022-03-01 DIAGNOSIS — J18.9 PNEUMONIA OF RIGHT MIDDLE LOBE DUE TO INFECTIOUS ORGANISM: ICD-10-CM

## 2022-03-01 PROCEDURE — 99213 OFFICE O/P EST LOW 20 MIN: CPT | Performed by: FAMILY MEDICINE

## 2022-03-01 PROCEDURE — 71046 X-RAY EXAM CHEST 2 VIEWS: CPT

## 2022-03-01 RX ORDER — AMOXICILLIN AND CLAVULANATE POTASSIUM 875; 125 MG/1; MG/1
1 TABLET, FILM COATED ORAL EVERY 12 HOURS SCHEDULED
Qty: 14 TABLET | Refills: 0 | Status: SHIPPED | OUTPATIENT
Start: 2022-03-01 | End: 2022-03-08

## 2022-03-01 RX ORDER — COVID-19 TEST SPECIMEN COLLECT
MISCELLANEOUS MISCELLANEOUS
COMMUNITY
Start: 2021-12-21

## 2022-03-01 RX ORDER — ALBUTEROL SULFATE 90 UG/1
AEROSOL, METERED RESPIRATORY (INHALATION)
COMMUNITY
Start: 2022-02-28

## 2022-03-01 RX ORDER — PREDNISONE 20 MG/1
TABLET ORAL
Qty: 18 TABLET | Refills: 0 | Status: SHIPPED | OUTPATIENT
Start: 2022-03-01

## 2022-03-01 RX ORDER — PREDNISONE 20 MG/1
TABLET ORAL
COMMUNITY
Start: 2022-02-28

## 2022-03-02 NOTE — PATIENT INSTRUCTIONS
Chest x-ray reveals right middle lobe pneumonia  Patient started empirically on Augmentin 875 mg twice a day for 7 days  I switched the patient's prednisone to prednisone tapering from 60 down to 20 mg over the next 9 days  Encourage patient to use albuterol inhaler as needed every 4-6 hours  Continue with Mucinex for expectoration  Increase fluid intake  Strongly encouraged patient to follow-up within the next 7-10 days with her PCP  She expressed understanding  Pneumonia   WHAT YOU NEED TO KNOW:   Pneumonia is an infection in your lungs caused by bacteria, viruses, fungi, or parasites  You can become infected if you come in contact with someone who is sick  You can get pneumonia if you recently had surgery or needed a ventilator to help you breathe  Pneumonia can also be caused by accidentally inhaling saliva or small pieces of food  Pneumonia may cause mild symptoms, or it can be severe and life-threatening  DISCHARGE INSTRUCTIONS:   Return to the emergency department if:   · You cough up blood  · Your heart beats more than 100 beats in 1 minute  · You are very tired, confused, and cannot think clearly  · You have chest pain or trouble breathing  · Your lips or fingernails turn gray or blue  Call your doctor if:   · Your symptoms are the same or get worse 48 hours after you start antibiotics  · Your fever is not below 99°F (37 2°C) 48 hours after you start antibiotics  · You have a fever higher than 101°F (38 3°C)  · You cannot eat, or you have loss of appetite, nausea, or are vomiting  · You have questions or concerns about your condition or care  Medicines: You may need any of the following:  · Antibiotics  treat pneumonia caused by bacteria  · Acetaminophen  decreases pain and fever  It is available without a doctor's order  Ask how much to take and how often to take it  Follow directions   Read the labels of all other medicines you are using to see if they also contain acetaminophen, or ask your doctor or pharmacist  Acetaminophen can cause liver damage if not taken correctly  Do not use more than 4 grams (4,000 milligrams) total of acetaminophen in one day  · NSAIDs , such as ibuprofen, help decrease swelling, pain, and fever  This medicine is available with or without a doctor's order  NSAIDs can cause stomach bleeding or kidney problems in certain people  If you take blood thinner medicine, always ask your healthcare provider if NSAIDs are safe for you  Always read the medicine label and follow directions  · Take your medicine as directed  Contact your healthcare provider if you think your medicine is not helping or if you have side effects  Tell him or her if you are allergic to any medicine  Keep a list of the medicines, vitamins, and herbs you take  Include the amounts, and when and why you take them  Bring the list or the pill bottles to follow-up visits  Carry your medicine list with you in case of an emergency  Follow up with your doctor as directed: You will need to return for more tests  Write down your questions so you remember to ask them during your visits  Manage your symptoms:   · Rest as needed  Rest often throughout the day  Alternate times of activity with times of rest     · Drink liquids as directed  Ask how much liquid to drink each day and which liquids are best for you  Liquids help thin your mucus, which may make it easier for you to cough it up  · Do not smoke  Avoid secondhand smoke  Smoking increases your risk for pneumonia  Smoking also makes it harder for you to get better after you have had pneumonia  Ask your healthcare provider for information if you need help to quit smoking  · Limit alcohol  Women should limit alcohol to 1 drink a day  Men should limit alcohol to 2 drinks a day  A drink of alcohol is 12 ounces of beer, 5 ounces of wine, or 1½ ounces of liquor  · Use a cool mist humidifier    A humidifier will help increase air moisture in your home  This may make it easier for you to breathe and help decrease your cough  · Keep your head elevated  You may be able to breathe better if you lie down with the head of your bed up  Prevent pneumonia:   · Wash your hands often  Use soap and water every time you wash your hands  Rub your soapy hands together, lacing your fingers  Use the fingers of one hand to scrub under the nails of the other hand  Wash for at least 20 seconds  Rinse with warm, running water for several seconds  Then dry your hands with a clean towel or paper towel  Use hand  that contains alcohol if soap and water are not available  Do not touch your eyes, nose, or mouth without washing your hands first          · Cover a sneeze or cough  Use a tissue that covers your mouth and nose  Throw the tissue away in a trash can right away  Use the bend of your arm if a tissue is not available  Wash your hands well with soap and water or use a hand   Do not stand close to anyone who is sneezing or coughing  · Stay away from others until you are well  Do not go to work or other activities  Wait until your symptoms are gone or your healthcare provider says it is okay to return  · Ask about vaccines you may need  You may need a vaccine to help prevent pneumonia  Get an influenza (flu) vaccine every year as soon as recommended, usually in September or October  Flu viruses change, so it is important to get a yearly flu vaccine  © Copyright [employer] 2022 Information is for End User's use only and may not be sold, redistributed or otherwise used for commercial purposes  All illustrations and images included in CareNotes® are the copyrighted property of [employer]  or [employer]   The above information is an  only  It is not intended as medical advice for individual conditions or treatments   Talk to your doctor, nurse or pharmacist before following any medical regimen to see if it is safe and effective for you

## 2022-03-02 NOTE — PROGRESS NOTES
[email or web address]        NAME: Jaida Soriano is a 22 y o  female  : 2000    MRN: [id number]  DATE: 2022  TIME: 7:18 PM    Assessment and Plan   SOB (shortness of breath) [R06 02]  1  SOB (shortness of breath)  XR chest pa & lateral         Patient Instructions       Follow up with PCP in 3-5 days  Proceed to  ER if symptoms worsen  Chief Complaint     Chief Complaint   Patient presents with    Shortness of Breath     Patient c/o cough, fatigue, SOB , and congestion x 2-3 day          History of Present Illness       81-year-old female here today with complaint of cough shortness of breath for last 2 3 days  She informs me that he has occasional productive cough is comes and goes  Denies wheezing  No previous history of asthma  Denies fever complaining some fatigue  He [facility] goes to [facility] that time she was prescribed prednisone, albuterol and Mucinex with no significant relief  Denies loss of smell loss of taste  Denies nausea, vomiting however she describes a 1 day history of loose watery diarrhea not accompanied by any blood or mucus  Denies any sick contact  Denies any known contact with anyone testing positive COVID-19  Describes that the chest pain is worse when she takes a deep breath it is centrally located does not radiate denies any heart condition  Review of Systems   Review of Systems   Constitutional: Negative  HENT: Positive for congestion and voice change  Respiratory: Positive for cough and shortness of breath  Cardiovascular: Positive for chest pain  Gastrointestinal: Positive for diarrhea           Current Medications       Current Outpatient Medications:     albuterol (PROVENTIL HFA,VENTOLIN HFA) 90 mcg/act inhaler, , Disp: , Rfl:     calcium carbonate (TUMS) 500 mg chewable tablet, Chew 1 tablet daily, Disp: , Rfl:     COVID-19 Specimen Collection KIT, TEST AS DIRECTED TODAY, Disp: , Rfl:    etonogestrel (NEXPLANON) subdermal implant, 68 mg by Subdermal route once for 1 dose, Disp: 1 each, Rfl: 0    famotidine (PEPCID) 20 mg tablet, Take 1 tablet (20 mg total) by mouth 2 (two) times a day, Disp: 60 tablet, Rfl: 11    Multiple Vitamin (multivitamin) tablet, Take 1 tablet by mouth daily, Disp: , Rfl:     omeprazole (PriLOSEC) 20 mg delayed release capsule, Take 1 capsule (20 mg total) by mouth 2 (two) times a day (Patient not taking: Reported on 3/1/2022 ), Disp: 180 capsule, Rfl: 0    predniSONE 20 mg tablet, , Disp: , Rfl:     Current Allergies     Allergies as of 03/01/2022    (No Known Allergies)            The following portions of the patient's history were reviewed and updated as appropriate: allergies, current medications, past family history, past medical history, past social history, past surgical history and problem list      Past Medical History:   Diagnosis Date    No known health problems        Past Surgical History:   Procedure Laterality Date    WISDOM TOOTH EXTRACTION  2019       Family History   Problem Relation Age of Onset    Hypertension Maternal Grandmother     Hyperlipidemia Maternal Grandmother     Hypertension Paternal Grandmother     Hyperlipidemia Paternal Grandmother     Breast cancer Neg Hx     Cancer Neg Hx          Medications have been verified  Objective   /68   Pulse 87   Temp 99 3 °F (37 4 °C)   Resp 18   Wt 57 6 kg (127 lb)   SpO2 100%   BMI 21 13 kg/m²   No LMP recorded  (Menstrual status: Birth Control)  Physical Exam     Physical Exam  Vitals and nursing note reviewed  Constitutional:       Appearance: She is well-developed  HENT:      Head:      Comments: Erythematous hypertrophic turbinates bilaterally  Mouth/Throat:      Mouth: Mucous membranes are moist       Comments: Erythematous posterior pharynx  Cobblestoning observed  No exudates are visualized    Pulmonary:      Breath sounds: Examination of the right-upper field reveals rales  Examination of the left-upper field reveals rales  Rales present  Lymphadenopathy:      Cervical: No cervical adenopathy  Neurological:      Mental Status: She is alert

## 2022-03-05 ENCOUNTER — OFFICE VISIT (OUTPATIENT)
Dept: URGENT CARE | Age: 22
End: 2022-03-05
Payer: COMMERCIAL

## 2022-03-05 ENCOUNTER — NURSE TRIAGE (OUTPATIENT)
Dept: OTHER | Facility: OTHER | Age: 22
End: 2022-03-05

## 2022-03-05 VITALS — TEMPERATURE: 97 F | OXYGEN SATURATION: 99 % | HEART RATE: 89 BPM | RESPIRATION RATE: 20 BRPM

## 2022-03-05 DIAGNOSIS — R06.2 WHEEZING: Primary | ICD-10-CM

## 2022-03-05 PROCEDURE — 87636 SARSCOV2 & INF A&B AMP PRB: CPT | Performed by: STUDENT IN AN ORGANIZED HEALTH CARE EDUCATION/TRAINING PROGRAM

## 2022-03-05 PROCEDURE — 99213 OFFICE O/P EST LOW 20 MIN: CPT | Performed by: STUDENT IN AN ORGANIZED HEALTH CARE EDUCATION/TRAINING PROGRAM

## 2022-03-05 NOTE — TELEPHONE ENCOUNTER
Regarding: Wheezing and sore throat   ----- Message from Shan Kemp sent at 3/5/2022  1:37 PM EST -----  " I was diagnosed with an pneumonia on 3/1/22 and now I have wheezing and sore throat  I am on antibiotic I am not getting any better "

## 2022-03-05 NOTE — PROGRESS NOTES
[facility]        NAME: Dena Guardian is a 22 y o  female  : 2000    MRN: [id number]  DATE: 2022  TIME: 3:37 PM    Assessment and Plan   Wheezing [R06 2]  1  Wheezing  [facility]         Patient Instructions       Follow up with PCP in 3-5 days  Proceed to  ER if symptoms worsen  Chief Complaint     Chief Complaint   Patient presents with    Wheezing     3/1/2022 dx  with pneumonia @ [facility], c/o inhalers not working          History of Present Illness       HPI   Was diagnosed with pneumonia on  started antibiotics as well as steroids  Patient states that she still some wheezing  Patient was not tested for COVID-19  Patient is vaccinated  She does not note any fevers or chills or difficulty breathing, however does state that she has some wheezing      Review of Systems   Review of Systems  Per hpi     Current Medications       Current Outpatient Medications:     albuterol (PROVENTIL HFA,VENTOLIN HFA) 90 mcg/act inhaler, , Disp: , Rfl:     amoxicillin-clavulanate (AUGMENTIN) 875-125 mg per tablet, Take 1 tablet by mouth every 12 (twelve) hours for 7 days, Disp: 14 tablet, Rfl: 0    calcium carbonate (TUMS) 500 mg chewable tablet, Chew 1 tablet daily, Disp: , Rfl:     COVID-19 Specimen Collection KIT, TEST AS DIRECTED TODAY, Disp: , Rfl:     etonogestrel (NEXPLANON) subdermal implant, 68 mg by Subdermal route once for 1 dose, Disp: 1 each, Rfl: 0    famotidine (PEPCID) 20 mg tablet, Take 1 tablet (20 mg total) by mouth 2 (two) times a day, Disp: 60 tablet, Rfl: 11    Multiple Vitamin (multivitamin) tablet, Take 1 tablet by mouth daily, Disp: , Rfl:     omeprazole (PriLOSEC) 20 mg delayed release capsule, Take 1 capsule (20 mg total) by mouth 2 (two) times a day (Patient not taking: Reported on 3/1/2022 ), Disp: 180 capsule, Rfl: 0    predniSONE 20 mg tablet, , Disp: , Rfl:     predniSONE 20 mg tablet, Take 3 tablets daily day 1 through 3 then 2 tablets daily day 4 through 6 then 1 tablet daily days 7 through 9 , Disp: 18 tablet, Rfl: 0    Current Allergies     Allergies as of 03/05/2022    (No Known Allergies)            The following portions of the patient's history were reviewed and updated as appropriate: allergies, current medications, past family history, past medical history, past social history, past surgical history and problem list      Past Medical History:   Diagnosis Date    No known health problems        Past Surgical History:   Procedure Laterality Date    WISDOM TOOTH EXTRACTION  2019       Family History   Problem Relation Age of Onset    Hypertension Maternal Grandmother     Hyperlipidemia Maternal Grandmother     Hypertension Paternal Grandmother     Hyperlipidemia Paternal Grandmother     Breast cancer Neg Hx     Cancer Neg Hx          Medications have been verified  Objective   Pulse 89   Temp (!) 97 °F (36 1 °C)   Resp 20   LMP 02/25/2022   SpO2 99%   Patient's last menstrual period was 02/25/2022  Physical Exam     Physical Exam  Constitutional:       General: She is not in acute distress  Appearance: Normal appearance  HENT:      Head: Normocephalic  Nose: No congestion or rhinorrhea  Mouth/Throat:      Mouth: Mucous membranes are moist       Pharynx: No oropharyngeal exudate or posterior oropharyngeal erythema  Eyes:      General:         Right eye: No discharge  Left eye: No discharge  Conjunctiva/sclera: Conjunctivae normal    Cardiovascular:      Rate and Rhythm: Normal rate and regular rhythm  Pulses: Normal pulses  Pulmonary:      Effort: Pulmonary effort is normal  No respiratory distress  Abdominal:      General: Abdomen is flat  There is no distension  Palpations: Abdomen is soft  Tenderness: There is no abdominal tenderness  Musculoskeletal:      Cervical back: Neck supple  Skin:     General: Skin is warm        Capillary Refill: Capillary refill takes less than 2 seconds  Neurological:      Mental Status: She is alert and oriented to person, place, and time

## 2022-03-05 NOTE — TELEPHONE ENCOUNTER
Recommended patient to be seen again today at [facility] for wheezing and worsening cough  Patient denied SOB; that had improved since starting antibiotics but she stated that she has wheezing that comes and goes since Friday morning  Patient able to speak in full sentences  Has inhaler that does help with relief of symptoms  Patient stated she is going to have her mother take her back to the ; advised if symptoms worsened, to be seen in the ED  Patient verbalized understanding  Reason for Disposition   [1] Taking antibiotic > 48 hours (2 days) for pneumonia AND [2] breathing not improved    Answer Assessment - Initial Assessment Questions  1  SYMPTOMS: "What symptoms are you most concerned about?" "Is it better, the same, or worse compared to when you saw the doctor?"      Wheezing-started yesterday around 4am; woke up in the middle of the night and was wheezing  Uses the inhaler and it helps momentarily  Mild SOB, but that has improved since being on abx    2  BREATHING DIFFICULTY: "Are you having any difficulty breathing?" If Yes, ask: "How bad is it?"  (e g , none, mild, moderate, severe)    - MILD: No SOB at rest, mild SOB with walking, speaks normally in sentences, can lay down, no retractions, pulse < 100      - MODERATE: SOB at rest, SOB with minimal exertion and prefers to sit, cannot lie down flat, speaks in phrases, mild retractions, audible wheezing, pulse 100-120      - SEVERE: Very SOB at rest, speaks in single words, struggling to breathe, sitting hunched forward, retractions, pulse > 120       Mild    3  FEVER: "Do you have a fever?" If Yes, ask: "What is your temperature, how was it measured, and when did it start?"      Denies    4  SPUTUM: "Describe the color of your sputum" (clear, white, yellow, green, blood-tinged)      Cough-getting worse but nonproductive    5  DIAGNOSIS CONFIRMATION: "When was the pneumonia diagnosed?" "By whom?"      3/1 at     6   ANTIBIOTIC: "Are you taking an antibiotic?"  If Yes, ask: "Which one?" "When was it started?"      Augmentin and prednisone    7  OTHER TREATMENT: "Are you receiving any other treatment for the pneumonia?" (e g , albuterol nebulizer, oxygen) If Yes, ask: "How often?" and "Do they help?"      Albuterol inhaler  8   HOSPITAL ADMISSION: "Were you hospitalized for this pneumonia?" If Yes, ask: "When were you discharged home from the hospital?"      N/A, was seen at [facility]    Protocols used: PNEUMONIA ON ANTIBIOTIC FOLLOW-UP CALL-ADULT-

## 2022-03-06 LAB
FLUAV RNA RESP QL NAA+PROBE: NEGATIVE
FLUBV RNA RESP QL NAA+PROBE: NEGATIVE
SARS-COV-2 RNA RESP QL NAA+PROBE: NEGATIVE

## 2022-03-11 ENCOUNTER — HOSPITAL ENCOUNTER (OUTPATIENT)
Dept: RADIOLOGY | Facility: HOSPITAL | Age: 22
Discharge: HOME | End: 2022-03-11
Payer: COMMERCIAL

## 2022-03-11 ENCOUNTER — OFFICE VISIT (OUTPATIENT)
Dept: FAMILY MEDICINE CLINIC | Facility: CLINIC | Age: 22
End: 2022-03-11

## 2022-03-11 VITALS
TEMPERATURE: 98.8 F | SYSTOLIC BLOOD PRESSURE: 110 MMHG | HEIGHT: 65 IN | RESPIRATION RATE: 16 BRPM | OXYGEN SATURATION: 98 % | HEART RATE: 95 BPM | BODY MASS INDEX: 21.83 KG/M2 | WEIGHT: 131 LBS | DIASTOLIC BLOOD PRESSURE: 70 MMHG

## 2022-03-11 DIAGNOSIS — J18.9 PNEUMONIA OF RIGHT LOWER LOBE DUE TO INFECTIOUS ORGANISM: Primary | ICD-10-CM

## 2022-03-11 DIAGNOSIS — J18.9 PNEUMONIA OF RIGHT LOWER LOBE DUE TO INFECTIOUS ORGANISM: ICD-10-CM

## 2022-03-11 PROCEDURE — 71046 X-RAY EXAM CHEST 2 VIEWS: CPT

## 2022-03-11 PROCEDURE — 99214 OFFICE O/P EST MOD 30 MIN: CPT | Performed by: NURSE PRACTITIONER

## 2022-03-14 PROBLEM — J18.9 PNEUMONIA OF RIGHT LOWER LOBE DUE TO INFECTIOUS ORGANISM: Status: ACTIVE | Noted: 2022-03-14

## 2022-03-14 NOTE — ASSESSMENT & PLAN NOTE
Lungs clear on exam, vitals normal   Pt denies s/s of illness  Will repeat CXR to trend right basilar opacity suspect for early pneumonia on 3/1/22  Advised so return to activity  As of the writing of this note, CXR has been read showing normal lung imaging

## 2022-03-14 NOTE — PROGRESS NOTES
Assessment/Plan:    Problem List Items Addressed This Visit        Respiratory    Pneumonia of right lower lobe due to infectious organism - Primary     Lungs clear on exam, vitals normal   Pt denies s/s of illness  Will repeat CXR to trend right basilar opacity suspect for early pneumonia on 3/1/22  Advised so return to activity  As of the writing of this note, CXR has been read showing normal lung imaging  Relevant Orders    XR chest pa & lateral (Completed)            No follow-ups on file  A chart review was performed and previous primary care visit notes were reviewed  All applicable imaging studies were reviewed and images were reviewed personally  All applicable laboratory studies were reviewed personally  Care everywhere review was performed if  available and all pertinent notes were reviewed  Subjective:     HPI: Long Barragan is a 22 y o  female who  has a past medical history of No known health problems  who presented to the office today for urgent care follow up  Was diagnosed with pneumonia on 03/01 at Urgent Care, started antibiotics as well as steroids  Patient states that she feels well with no more SOB  Patient was  tested for COVID-19 - negative 9 days ago  Patient is vaccinated  She does not note any fevers or chills or difficulty breathing  Did not have repeat chest xray at urgent care on 3/5/22 when she returned for mild wheezing  She was advised to f/u with PCP      The following portions of the patient's history were reviewed and updated as appropriate: allergies, current medications, past family history, past medical history, past social history, past surgical history, and problem list     Current Outpatient Medications on File Prior to Visit   Medication Sig Dispense Refill    albuterol (PROVENTIL HFA,VENTOLIN HFA) 90 mcg/act inhaler       calcium carbonate (TUMS) 500 mg chewable tablet Chew 1 tablet daily      COVID-19 Specimen Collection KIT TEST AS DIRECTED TODAY      etonogestrel (NEXPLANON) subdermal implant 68 mg by Subdermal route once for 1 dose 1 each 0    famotidine (PEPCID) 20 mg tablet Take 1 tablet (20 mg total) by mouth 2 (two) times a day 60 tablet 11    Multiple Vitamin (multivitamin) tablet Take 1 tablet by mouth daily      omeprazole (PriLOSEC) 20 mg delayed release capsule Take 1 capsule (20 mg total) by mouth 2 (two) times a day (Patient not taking: Reported on 3/1/2022 ) 180 capsule 0    predniSONE 20 mg tablet       predniSONE 20 mg tablet Take 3 tablets daily day 1 through 3 then 2 tablets daily day 4 through 6 then 1 tablet daily days 7 through 9  18 tablet 0     No current facility-administered medications on file prior to visit  Review of Systems   Constitutional: Negative  HENT: Negative  Eyes: Negative  Respiratory: Negative  Cardiovascular: Negative  Gastrointestinal: Negative  Endocrine: Negative  Genitourinary: Negative  Musculoskeletal: Negative  Skin: Negative  Allergic/Immunologic: Negative  Neurological: Negative  Psychiatric/Behavioral: Negative  Objective:    /70 (BP Location: Right arm, Patient Position: Sitting, Cuff Size: Standard)   Pulse 95   Temp 98 8 °F (37 1 °C) (Temporal)   Resp 16   Ht 5' 5" (1 651 m)   Wt 59 4 kg (131 lb)   LMP 02/25/2022 (Exact Date)   SpO2 98%   Breastfeeding No   BMI 21 80 kg/m²     Physical Exam  Vitals reviewed  Constitutional:       General: She is not in acute distress  Appearance: Normal appearance  HENT:      Head: Normocephalic  Right Ear: External ear normal       Left Ear: External ear normal       Nose: Nose normal  No congestion  Mouth/Throat:      Mouth: Mucous membranes are moist    Eyes:      Extraocular Movements: Extraocular movements intact  Conjunctiva/sclera: Conjunctivae normal    Cardiovascular:      Rate and Rhythm: Normal rate and regular rhythm  Heart sounds: Normal heart sounds  No murmur heard  No friction rub  No gallop  Pulmonary:      Effort: Pulmonary effort is normal       Breath sounds: Normal breath sounds  No wheezing  Musculoskeletal:         General: Normal range of motion  Cervical back: Normal range of motion and neck supple  No muscular tenderness  Right lower leg: No edema  Left lower leg: No edema  Skin:     General: Skin is warm and dry  Capillary Refill: Capillary refill takes less than 2 seconds  Neurological:      General: No focal deficit present  Mental Status: She is alert  Psychiatric:         Mood and Affect: Mood normal          Behavior: Behavior normal          Thought Content: Thought content normal          CLARI Del Rio  03/14/22  3:10 PM    There are no Patient Instructions on file for this visit

## 2022-06-20 ENCOUNTER — TELEPHONE (OUTPATIENT)
Dept: OBGYN CLINIC | Facility: CLINIC | Age: 22
End: 2022-06-20

## 2022-07-13 ENCOUNTER — OFFICE VISIT (OUTPATIENT)
Dept: URGENT CARE | Age: 22
End: 2022-07-13
Payer: COMMERCIAL

## 2022-07-13 VITALS — OXYGEN SATURATION: 99 % | HEART RATE: 84 BPM | RESPIRATION RATE: 16 BRPM | TEMPERATURE: 98.1 F

## 2022-07-13 DIAGNOSIS — J02.9 SORE THROAT: Primary | ICD-10-CM

## 2022-07-13 PROCEDURE — 99213 OFFICE O/P EST LOW 20 MIN: CPT | Performed by: STUDENT IN AN ORGANIZED HEALTH CARE EDUCATION/TRAINING PROGRAM

## 2022-07-13 RX ORDER — AMOXICILLIN AND CLAVULANATE POTASSIUM 875; 125 MG/1; MG/1
1 TABLET, FILM COATED ORAL EVERY 12 HOURS SCHEDULED
Qty: 14 TABLET | Refills: 0 | Status: SHIPPED | OUTPATIENT
Start: 2022-07-13 | End: 2022-07-14

## 2022-07-13 NOTE — PROGRESS NOTES
[address] [facility]        NAME: Julio Cesar Elliott is a 25 y o  female  : 2000    MRN: [id number]  DATE: 2022  TIME: 7:07 PM    Assessment and Plan   Sore throat [J02 9]  1  Sore throat  amoxicillin-clavulanate (AUGMENTIN) 875-125 mg per tablet         Patient Instructions       Follow up with PCP in 3-5 days  Proceed to  ER if symptoms worsen  Chief Complaint     Chief Complaint   Patient presents with    Sore Throat     Since yesterday, swollen tonsils with redness and white spots         History of Present Illness       HPI  Patient presents today complaining of sore throat has been going on for the past few days  She states her thought tonsils are very swollen hurts when she swallows    She has also noticed redness and white spots in the back of her throat  Review of Systems   Review of Systems    Per hpi   Current Medications       Current Outpatient Medications:     albuterol (PROVENTIL HFA,VENTOLIN HFA) 90 mcg/act inhaler, , Disp: , Rfl:     amoxicillin-clavulanate (AUGMENTIN) 875-125 mg per tablet, Take 1 tablet by mouth every 12 (twelve) hours for 7 days, Disp: 14 tablet, Rfl: 0    calcium carbonate (TUMS) 500 mg chewable tablet, Chew 1 tablet daily, Disp: , Rfl:     COVID-19 Specimen Collection KIT, TEST AS DIRECTED TODAY, Disp: , Rfl:     famotidine (PEPCID) 20 mg tablet, Take 1 tablet (20 mg total) by mouth 2 (two) times a day, Disp: 60 tablet, Rfl: 11    Multiple Vitamin (multivitamin) tablet, Take 1 tablet by mouth daily, Disp: , Rfl:     predniSONE 20 mg tablet, , Disp: , Rfl:     predniSONE 20 mg tablet, Take 3 tablets daily day 1 through 3 then 2 tablets daily day 4 through 6 then 1 tablet daily days 7 through 9 , Disp: 18 tablet, Rfl: 0    etonogestrel (NEXPLANON) subdermal implant, 68 mg by Subdermal route once for 1 dose, Disp: 1 each, Rfl: 0    omeprazole (PriLOSEC) 20 mg delayed release capsule, Take 1 capsule (20 mg total) by mouth 2 (two) times a day (Patient not taking: Reported on 3/1/2022 ), Disp: 180 capsule, Rfl: 0    Current Allergies     Allergies as of 07/13/2022    (No Known Allergies)            The following portions of the patient's history were reviewed and updated as appropriate: allergies, current medications, past family history, past medical history, past social history, past surgical history and problem list      Past Medical History:   Diagnosis Date    No known health problems        Past Surgical History:   Procedure Laterality Date    WISDOM TOOTH EXTRACTION  2019       Family History   Problem Relation Age of Onset    Hypertension Maternal Grandmother     Hyperlipidemia Maternal Grandmother     Hypertension Paternal Grandmother     Hyperlipidemia Paternal Grandmother     Breast cancer Neg Hx     Cancer Neg Hx          Medications have been verified  Objective   Pulse 84   Temp 98 1 °F (36 7 °C)   Resp 16   SpO2 99%   No LMP recorded  (Menstrual status: Birth Control)  Physical Exam     Physical Exam  Constitutional:       General: She is not in acute distress  Appearance: Normal appearance  HENT:      Head: Normocephalic  Nose: No congestion or rhinorrhea  Mouth/Throat:      Mouth: Mucous membranes are moist       Pharynx: Posterior oropharyngeal erythema present  No oropharyngeal exudate  Tonsils: Tonsillar exudate present  2+ on the right  1+ on the left  Eyes:      General:         Right eye: No discharge  Left eye: No discharge  Conjunctiva/sclera: Conjunctivae normal    Cardiovascular:      Rate and Rhythm: Normal rate and regular rhythm  Pulses: Normal pulses  Pulmonary:      Effort: Pulmonary effort is normal  No respiratory distress  Abdominal:      General: Abdomen is flat  There is no distension  Palpations: Abdomen is soft  Tenderness: There is no abdominal tenderness  Musculoskeletal:      Cervical back: Neck supple  Skin:     General: Skin is warm  Capillary Refill: Capillary refill takes less than 2 seconds  Neurological:      Mental Status: She is alert and oriented to person, place, and time

## 2022-07-14 DIAGNOSIS — J02.9 SORE THROAT: Primary | ICD-10-CM

## 2022-07-14 RX ORDER — AZITHROMYCIN 250 MG/1
TABLET, FILM COATED ORAL
Qty: 6 TABLET | Refills: 0 | Status: SHIPPED | OUTPATIENT
Start: 2022-07-14 | End: 2022-07-19

## 2022-07-14 NOTE — PROGRESS NOTES
Patient called reporting hives on her upper back and arms after taking 2nd dose of Augmentin today  She is wondering if she should continue the medication  She notes that she has had this medication in the past without having a reaction  At this point, recommending she discontinue the medication, will trial a new antibiotic  Patient denies chest pain, palpitations, shortness of breath, lip or tongue swelling or wheezing  Patient instructed to monitor for signs of anaphylaxis

## 2022-10-11 PROBLEM — J18.9 PNEUMONIA OF RIGHT LOWER LOBE DUE TO INFECTIOUS ORGANISM: Status: RESOLVED | Noted: 2022-03-14 | Resolved: 2022-10-11

## 2022-12-16 NOTE — PROGRESS NOTES
[address] [facility]    NAME: Mattie Silvestre  AGE: 25 y o  SEX: female  : 2000     DATE: 2022     Assessment and Plan:     Problem List Items Addressed This Visit        Other    Annual physical exam - Primary    Relevant Orders    Basic metabolic panel    CBC and differential   Other Visit Diagnoses     Need for hepatitis C screening test        Relevant Orders    Hepatitis C Antibody ([facility])    Screening for HIV (human immunodeficiency virus)        Relevant Orders    HIV 1/2 AG/AB w Reflex [facility] for over 2 yr old    Screening for STDs (sexually transmitted diseases)        Encounter for immunization        Relevant Orders    TDAP VACCINE GREATER THAN OR EQUAL TO 8YO IM          Immunizations and preventive care screenings were discussed with patient today  Appropriate education was printed on patient's after visit summary  Counseling:  Alcohol/drug use: discussed moderation in alcohol intake, the recommendations for healthy alcohol use, and avoidance of illicit drug use  Dental Health: discussed importance of regular tooth brushing, flossing, and dental visits  Injury prevention: discussed safety/seat belts, safety helmets, smoke detectors, carbon dioxide detectors, and smoking near bedding or upholstery  Sexual health: discussed sexually transmitted diseases, partner selection, use of condoms, avoidance of unintended pregnancy, and contraceptive alternatives  Exercise: the importance of regular exercise/physical activity was discussed  Recommend exercise 3-5 times per week for at least 30 minutes              Return in about 1 year (around 2023) for Annual physical      Chief Complaint:     Chief Complaint   Patient presents with   • Physical Exam      History of Present Illness:     Adult Annual Physical   Patient here for a comprehensive physical exam  The patient reports no problems  Diet and Physical Activity  · Diet/Nutrition: well balanced diet  · Exercise: strength training exercises and 5-7 times a week on average  Depression Screening  PHQ-2/9 Depression Screening         General Health  · Sleep: gets 4-6 hours of sleep on average  · Hearing: normal - bilateral   · Vision: goes for regular eye exams, most recent eye exam <1 year ago, wears glasses and wears contacts  · Dental: regular dental visits, brushes teeth three times daily and does not floss  /GYN Health  · Last menstrual period: 10/8/22  · Contraceptive method: oral contraceptives, nexplanon  · History of STDs?: no      Review of Systems:     Review of Systems   Constitutional: Negative for chills and fever  HENT: Negative for ear pain and sore throat  Eyes: Negative for pain and visual disturbance  Respiratory: Negative for cough and shortness of breath  Cardiovascular: Negative for chest pain and palpitations  Gastrointestinal: Negative for abdominal pain and vomiting  Genitourinary: Negative for dysuria and hematuria  Musculoskeletal: Negative for arthralgias and back pain  Skin: Negative for color change and rash  Neurological: Negative for seizures and syncope  All other systems reviewed and are negative       Past Medical History:     Past Medical History:   Diagnosis Date   • No known health problems       Past Surgical History:     Past Surgical History:   Procedure Laterality Date   • WISDOM TOOTH EXTRACTION  2019      Social History:     Social History     Socioeconomic History   • Marital status: Single     Spouse name: None   • Number of children: None   • Years of education: None   • Highest education level: None   Occupational History   • None   Tobacco Use   • Smoking status: Never   • Smokeless tobacco: Never   Vaping Use   • Vaping Use: Never used   Substance and Sexual Activity   • Alcohol use: Yes     Comment: Rare    • Drug use: Not Currently     Types: Marijuana     Comment: last used marijuana 3/2021   • Sexual activity: Yes     Partners: Male     Birth control/protection: OCP   Other Topics Concern   • None   Social History Narrative   • None     Social Determinants of Health     Financial Resource Strain: Low Risk    • Difficulty of Paying Living Expenses: Not hard at all   Food Insecurity: No Food Insecurity   • Worried About [employer] in the Last Year: Never true   • Ran Out of Food in the Last Year: Never true   Transportation Needs: No Transportation Needs   • Lack of Transportation (Medical): No   • Lack of Transportation (Non-Medical): No   Physical Activity: Not on file   Stress: Not on file   Social Connections: Not on file   Intimate Partner Violence: Not on file   Housing Stability: Not on file      Family History:     Family History   Problem Relation Age of Onset   • Hypertension Maternal Grandmother    • Hyperlipidemia Maternal Grandmother    • Hypertension Paternal Grandmother    • Hyperlipidemia Paternal Grandmother    • Breast cancer Neg Hx    • Cancer Neg Hx       Current Medications:     Current Outpatient Medications   Medication Sig Dispense Refill   • calcium carbonate (TUMS) 500 mg chewable tablet Chew 1 tablet daily     • etonogestrel (NEXPLANON) subdermal implant 68 mg by Subdermal route once for 1 dose 1 each 0   • famotidine (PEPCID) 20 mg tablet Take 1 tablet (20 mg total) by mouth 2 (two) times a day 60 tablet 11   • Multiple Vitamin (multivitamin) tablet Take 1 tablet by mouth daily     • norgestimate-ethinyl estradiol (ORTHO-CYCLEN) 0 25-35 MG-MCG per tablet Take 1 tablet by mouth daily       No current facility-administered medications for this visit  Allergies: Allergies   Allergen Reactions   • Augmentin [Amoxicillin-Pot Clavulanate] Hives     Reports hives after 2nd dose of Augmentin  Patient has had in the past without reaction        Physical Exam:     /64 (BP Location: Right arm, Patient Position: Sitting, Cuff Size: Adult)   Pulse 71   Temp (!) 96 4 °F (35 8 °C) (Temporal)   Resp 18   Ht 5' 5" (1 651 m)   Wt 54 9 kg (121 lb)   LMP  (LMP Unknown)   SpO2 98%   BMI 20 14 kg/m²     Physical Exam  Vitals and nursing note reviewed  Constitutional:       General: She is not in acute distress  Appearance: Normal appearance  She is well-developed  HENT:      Head: Normocephalic and atraumatic  Right Ear: External ear normal       Left Ear: External ear normal       Nose: Nose normal       Mouth/Throat:      Mouth: Mucous membranes are moist       Pharynx: Oropharynx is clear  Eyes:      Extraocular Movements: Extraocular movements intact  Conjunctiva/sclera: Conjunctivae normal       Pupils: Pupils are equal, round, and reactive to light  Cardiovascular:      Rate and Rhythm: Normal rate and regular rhythm  Pulses: Normal pulses  Heart sounds: Normal heart sounds  No murmur heard  Pulmonary:      Effort: Pulmonary effort is normal  No respiratory distress  Breath sounds: Normal breath sounds  Abdominal:      Palpations: Abdomen is soft  Tenderness: There is no abdominal tenderness  Musculoskeletal:         General: No swelling  Normal range of motion  Cervical back: Normal range of motion and neck supple  Skin:     General: Skin is warm and dry  Capillary Refill: Capillary refill takes less than 2 seconds  Neurological:      Mental Status: She is alert and oriented to person, place, and time  Mental status is at baseline  Psychiatric:         Mood and Affect: Mood normal          Behavior: Behavior normal          Thought Content:  Thought content normal          Judgment: Judgment normal           Leno Krishnamurthy 35

## 2022-12-21 ENCOUNTER — ANNUAL EXAM (OUTPATIENT)
Dept: OBGYN CLINIC | Facility: CLINIC | Age: 22
End: 2022-12-21

## 2022-12-21 ENCOUNTER — OFFICE VISIT (OUTPATIENT)
Dept: FAMILY MEDICINE CLINIC | Facility: CLINIC | Age: 22
End: 2022-12-21

## 2022-12-21 VITALS
HEIGHT: 65 IN | TEMPERATURE: 96.4 F | WEIGHT: 121 LBS | HEART RATE: 71 BPM | OXYGEN SATURATION: 98 % | SYSTOLIC BLOOD PRESSURE: 110 MMHG | BODY MASS INDEX: 20.16 KG/M2 | RESPIRATION RATE: 18 BRPM | DIASTOLIC BLOOD PRESSURE: 64 MMHG

## 2022-12-21 VITALS
BODY MASS INDEX: 20.69 KG/M2 | HEIGHT: 65 IN | DIASTOLIC BLOOD PRESSURE: 72 MMHG | HEART RATE: 76 BPM | SYSTOLIC BLOOD PRESSURE: 108 MMHG | WEIGHT: 124.2 LBS

## 2022-12-21 DIAGNOSIS — Z01.419 ENCOUNTER FOR GYNECOLOGICAL EXAMINATION WITHOUT ABNORMAL FINDING: Primary | ICD-10-CM

## 2022-12-21 DIAGNOSIS — Z00.00 ANNUAL PHYSICAL EXAM: Primary | ICD-10-CM

## 2022-12-21 DIAGNOSIS — Z12.4 SCREENING FOR CERVICAL CANCER: ICD-10-CM

## 2022-12-21 DIAGNOSIS — Z11.3 SCREEN FOR STD (SEXUALLY TRANSMITTED DISEASE): ICD-10-CM

## 2022-12-21 DIAGNOSIS — Z11.4 SCREENING FOR HIV (HUMAN IMMUNODEFICIENCY VIRUS): ICD-10-CM

## 2022-12-21 DIAGNOSIS — Z23 ENCOUNTER FOR IMMUNIZATION: ICD-10-CM

## 2022-12-21 DIAGNOSIS — Z11.3 SCREENING FOR STDS (SEXUALLY TRANSMITTED DISEASES): ICD-10-CM

## 2022-12-21 DIAGNOSIS — Z12.39 ENCOUNTER FOR BREAST CANCER SCREENING USING NON-MAMMOGRAM MODALITY: ICD-10-CM

## 2022-12-21 DIAGNOSIS — Z11.59 NEED FOR HEPATITIS C SCREENING TEST: ICD-10-CM

## 2022-12-21 PROBLEM — K21.9 GASTROESOPHAGEAL REFLUX DISEASE WITHOUT ESOPHAGITIS: Status: RESOLVED | Noted: 2021-07-14 | Resolved: 2022-12-21

## 2022-12-21 PROBLEM — N94.89 LABIAL SWELLING: Status: RESOLVED | Noted: 2021-08-06 | Resolved: 2022-12-21

## 2022-12-21 RX ORDER — NORGESTIMATE AND ETHINYL ESTRADIOL 0.25-0.035
1 KIT ORAL DAILY
COMMUNITY

## 2022-12-21 NOTE — LETTER
2022    To Estefania De Anda  : 2000      This letter is to advise you that your recent CULTURES for gonorrhea and chlamydia were reviewed by me and are NORMAL  Please contact the office for an appointment if you have any additional concerns      [id number] Eduar Glaser

## 2022-12-21 NOTE — PATIENT INSTRUCTIONS
Thank you for your confidence in our team    We appreciate you and welcome your feedback  If you receive a survey from us, please take a few moments to let us know how we are doing     Sincerely,  CLARI Melton

## 2022-12-21 NOTE — PROGRESS NOTES
Bill Mckeon is a 25 y o  female who presents today for annual GYN exam   She has never had a pap smear performed previously  She reports menses as irregular due to Nexplanon and she started OCP's for management of secondary AUB 2 months ago  Reports no menses since then  Patient's last menstrual period was 10/08/2022 (exact date)  Her general medical history has been reviewed and she reports it as follows:    Past Medical History:   Diagnosis Date   • No known health problems      Past Surgical History:   Procedure Laterality Date   • WISDOM TOOTH EXTRACTION       OB History        0    Para   0    Term   0       0    AB   0    Living   0       SAB   0    IAB   0    Ectopic   0    Multiple   0    Live Births   0               Social History     Tobacco Use   • Smoking status: Never   • Smokeless tobacco: Never   Vaping Use   • Vaping Use: Never used   Substance Use Topics   • Alcohol use: Yes     Comment: Rare    • Drug use: Not Currently     Types: Marijuana     Comment: last used marijuana 3/2021     Social History     Substance and Sexual Activity   Sexual Activity Yes   • Partners: Male   • Birth control/protection: OCP     Cancer-related family history is negative for Breast cancer and Cancer  Current Outpatient Medications   Medication Instructions   • calcium carbonate (TUMS) 500 mg chewable tablet 1 tablet, Oral, Daily   • etonogestrel (NEXPLANON) 68 mg, Subdermal, Once   • famotidine (PEPCID) 20 mg, Oral, 2 times daily   • Multiple Vitamin (multivitamin) tablet 1 tablet, Oral, Daily   • norgestimate-ethinyl estradiol (ORTHO-CYCLEN) 0 25-35 MG-MCG per tablet 1 tablet, Oral, Daily       Review of Systems:  Review of Systems   Constitutional: Negative  Gastrointestinal: Negative  Genitourinary: Negative for difficulty urinating, menstrual problem, pelvic pain and vaginal discharge  Skin: Negative          Physical Exam:  /72   Pulse 76   Ht 5' 5" (1 651 m)   Wt 56 3 kg (124 lb 3 2 oz)   LMP 10/08/2022 (Exact Date)   BMI 20 67 kg/m²   Physical Exam  Constitutional:       General: She is not in acute distress  Appearance: She is well-developed  Genitourinary:      Vulva normal       No lesions in the vagina  Right Adnexa: not tender and no mass present  Left Adnexa: not tender and no mass present  No cervical motion tenderness or lesion  Uterus is not tender  Breasts:     Right: Inverted nipple present  No mass, nipple discharge, skin change or tenderness  Left: Inverted nipple present  No mass, nipple discharge, skin change or tenderness  Neck:      Thyroid: No thyromegaly  Cardiovascular:      Rate and Rhythm: Normal rate and regular rhythm  Pulmonary:      Effort: Pulmonary effort is normal    Chest:       Abdominal:      Palpations: Abdomen is soft  Tenderness: There is no abdominal tenderness  Musculoskeletal:      Cervical back: Neck supple  Neurological:      Mental Status: She is alert and oriented to person, place, and time  Skin:     General: Skin is warm and dry  Vitals reviewed  Assessment/Plan:   1  Normal well-woman GYN exam   2  Cervical cancer screening:  Normal cervical exam   Pap smear done with HPV reflex  3  STD screening:  Orders placed for vaginal GC/CT cultures  Declines screening with serum anti-HIV, anti-HCV, HbsAg, RPR    4  Breast cancer screening:  Normal breast exam   Reviewed breast self-awareness  5  Depression Screening: Patient's depression screening was assessed with a PHQ-2 score of 0  Their PHQ-9 score was 2  Clinically patient does not have depression  No treatment is required  6  BMI Counseling: Body mass index is 20 67 kg/m²  No intervention indicated     7  Contraception:  Nexplanon with secondary OCP's    8  Return to office in 1 year for annual GYN exam     Reviewed with patient that test results are available in [address] immediately, but that they will not necessarily be reviewed by me immediately  Explained that I will review results at my earliest opportunity and contact patient appropriately

## 2022-12-22 LAB
C TRACH DNA SPEC QL NAA+PROBE: NEGATIVE
N GONORRHOEA DNA SPEC QL NAA+PROBE: NEGATIVE

## 2022-12-28 LAB
LAB AP GYN PRIMARY INTERPRETATION: ABNORMAL
Lab: ABNORMAL
PATH INTERP SPEC-IMP: ABNORMAL

## 2022-12-29 ENCOUNTER — TELEPHONE (OUTPATIENT)
Dept: OBGYN CLINIC | Facility: CLINIC | Age: 22
End: 2022-12-29

## 2022-12-29 NOTE — TELEPHONE ENCOUNTER
I called patient and reviewed with her that her pap smear with LSIL result  Advised repeat pap smear next year and no intervention indicated currently  She verbalizes understanding

## 2023-01-25 DIAGNOSIS — N93.9 ABNORMAL UTERINE BLEEDING (AUB): Primary | ICD-10-CM

## 2023-01-25 RX ORDER — NORGESTIMATE AND ETHINYL ESTRADIOL 0.25-0.035
1 KIT ORAL DAILY
Qty: 28 TABLET | Refills: 3 | Status: SHIPPED | OUTPATIENT
Start: 2023-01-25

## 2023-03-13 ENCOUNTER — APPOINTMENT (OUTPATIENT)
Dept: LAB | Facility: CLINIC | Age: 23
End: 2023-03-13

## 2023-03-13 DIAGNOSIS — Z00.00 ANNUAL PHYSICAL EXAM: ICD-10-CM

## 2023-03-13 DIAGNOSIS — Z11.59 NEED FOR HEPATITIS C SCREENING TEST: ICD-10-CM

## 2023-03-13 DIAGNOSIS — Z11.4 SCREENING FOR HIV (HUMAN IMMUNODEFICIENCY VIRUS): ICD-10-CM

## 2023-03-13 LAB
ANION GAP SERPL CALCULATED.3IONS-SCNC: 5 MMOL/L (ref 4–13)
BASOPHILS # BLD AUTO: 0.04 THOUSANDS/ÂΜL (ref 0–0.1)
BASOPHILS NFR BLD AUTO: 0 % (ref 0–1)
BUN SERPL-MCNC: 13 MG/DL (ref 5–25)
CALCIUM SERPL-MCNC: 9.5 MG/DL (ref 8.3–10.1)
CHLORIDE SERPL-SCNC: 107 MMOL/L (ref 96–108)
CO2 SERPL-SCNC: 24 MMOL/L (ref 21–32)
CREAT SERPL-MCNC: 0.67 MG/DL (ref 0.6–1.3)
EOSINOPHIL # BLD AUTO: 0.15 THOUSAND/ÂΜL (ref 0–0.61)
EOSINOPHIL NFR BLD AUTO: 2 % (ref 0–6)
ERYTHROCYTE [DISTWIDTH] IN BLOOD BY AUTOMATED COUNT: 11.8 % (ref 11.6–15.1)
GFR SERPL CREATININE-BSD FRML MDRD: 126 ML/MIN/1.73SQ M
GLUCOSE P FAST SERPL-MCNC: 84 MG/DL (ref 65–99)
HCT VFR BLD AUTO: 40.7 % (ref 34.8–46.1)
HCV AB SER QL: NORMAL
HGB BLD-MCNC: 13.2 G/DL (ref 11.5–15.4)
IMM GRANULOCYTES # BLD AUTO: 0.02 THOUSAND/UL (ref 0–0.2)
IMM GRANULOCYTES NFR BLD AUTO: 0 % (ref 0–2)
LYMPHOCYTES # BLD AUTO: 3.58 THOUSANDS/ÂΜL (ref 0.6–4.47)
LYMPHOCYTES NFR BLD AUTO: 39 % (ref 14–44)
MCH RBC QN AUTO: 31.8 PG (ref 26.8–34.3)
MCHC RBC AUTO-ENTMCNC: 32.4 G/DL (ref 31.4–37.4)
MCV RBC AUTO: 98 FL (ref 82–98)
MONOCYTES # BLD AUTO: 0.44 THOUSAND/ÂΜL (ref 0.17–1.22)
MONOCYTES NFR BLD AUTO: 5 % (ref 4–12)
NEUTROPHILS # BLD AUTO: 4.87 THOUSANDS/ÂΜL (ref 1.85–7.62)
NEUTS SEG NFR BLD AUTO: 54 % (ref 43–75)
NRBC BLD AUTO-RTO: 0 /100 WBCS
PLATELET # BLD AUTO: 307 THOUSANDS/UL (ref 149–390)
PMV BLD AUTO: 9.9 FL (ref 8.9–12.7)
POTASSIUM SERPL-SCNC: 3.7 MMOL/L (ref 3.5–5.3)
RBC # BLD AUTO: 4.15 MILLION/UL (ref 3.81–5.12)
SODIUM SERPL-SCNC: 136 MMOL/L (ref 135–147)
WBC # BLD AUTO: 9.1 THOUSAND/UL (ref 4.31–10.16)

## 2023-03-14 LAB
HIV 1+2 AB+HIV1 P24 AG SERPL QL IA: NORMAL
HIV 2 AB SERPL QL IA: NORMAL
HIV1 AB SERPL QL IA: NORMAL
HIV1 P24 AG SERPL QL IA: NORMAL

## 2023-05-21 ENCOUNTER — OFFICE VISIT (OUTPATIENT)
Dept: URGENT CARE | Age: 23
End: 2023-05-21

## 2023-05-21 VITALS
WEIGHT: 130 LBS | DIASTOLIC BLOOD PRESSURE: 57 MMHG | HEART RATE: 81 BPM | TEMPERATURE: 97.6 F | HEIGHT: 65 IN | OXYGEN SATURATION: 99 % | SYSTOLIC BLOOD PRESSURE: 109 MMHG | BODY MASS INDEX: 21.66 KG/M2 | RESPIRATION RATE: 18 BRPM

## 2023-05-21 DIAGNOSIS — R51.9 ACUTE NONINTRACTABLE HEADACHE, UNSPECIFIED HEADACHE TYPE: Primary | ICD-10-CM

## 2023-05-21 RX ORDER — SACCHAROMYCES BOULARDII 250 MG
250 CAPSULE ORAL 2 TIMES DAILY
COMMUNITY

## 2023-05-21 NOTE — PROGRESS NOTES
"  [facility]        NAME: Azra Wyatt is a 25 y o  female  : 2000    MRN: [id number]  DATE: May 21, 2023  TIME: 1:22 PM    Assessment and Plan   Acute nonintractable headache, unspecified headache type [R51 9]  1  Acute nonintractable headache, unspecified headache type  Ambulatory Referral to Neurology        Neuro exam WNL  No signs of sinus infection  Recommend further workup by PCP or neurology  Referral to neurology placed  Discussed symptomatic treatment for the time being  Patient Instructions     No signs of sinus infection on exam today  Continue with daily allergy medications  Caution with lifting, if you start to feel any worse, stop lifting until seen by your PCP/neurology  Drink plenty of fluids throughout the day  Continue with Advil as needed for pain  Obtain 7-8 hours of sleep each night  Follow up with PCP in 3-5 days, neurology when available  Proceed to  ER if symptoms worsen  Chief Complaint     Chief Complaint   Patient presents with   • Headache     Patient states she is getting \"exertion headaches\" when she is working out x 2 weeks, cough x 2 weeks         History of Present Illness       Patient presents with headache with exertion and occasionally with sleeping  The headaches have come every time she has performed weight lifting in the past two weeks, states they do not occur when she performs cardio exercises  It is located on the left side of her head - can be anywhere from face to back of her head  This has been going on for the past 2 weeks, is no better or worse than when it first started  She describes the pain as throbbing, states it is a 9/10  The pain seems to wear off then come back  The episodes last anywhere from 15 minutes to an hour  She states when it occurs during her sleep the pain wakes her up, it goes away when she falls back to sleep  She has taken Advil, which relieves the headache   Patient used to see neurologist a few years ago due to " headache issues which went away on their own  She states the headaches she is having now are different  History of allergies, takes Flonase daily, Claritin occasionally  Review of Systems   Review of Systems   Constitutional: Negative for appetite change, chills, fatigue and fever  HENT: Positive for postnasal drip, sinus pressure (both sides of face - cheeks under eyes, at temples) and sore throat (in morning when she wakes up, goes away)  Negative for congestion, ear discharge, ear pain and rhinorrhea  Eyes: Negative for pain, discharge, redness and itching  Respiratory: Positive for cough (slight, at night/waking up in the morning)  Negative for chest tightness, shortness of breath and wheezing  Cardiovascular: Negative for chest pain and palpitations  Gastrointestinal: Positive for nausea (associated with headaches)  Negative for abdominal pain and vomiting  Musculoskeletal: Negative for myalgias  Neurological: Positive for headaches  Negative for dizziness, seizures, syncope, weakness, light-headedness and numbness           Current Medications       Current Outpatient Medications:   •  calcium carbonate (TUMS) 500 mg chewable tablet, Chew 1 tablet daily, Disp: , Rfl:   •  etonogestrel (NEXPLANON) subdermal implant, 68 mg by Subdermal route once for 1 dose, Disp: 1 each, Rfl: 0  •  famotidine (PEPCID) 20 mg tablet, Take 1 tablet (20 mg total) by mouth 2 (two) times a day, Disp: 60 tablet, Rfl: 11  •  norgestimate-ethinyl estradiol (ORTHO-CYCLEN) 0 25-35 MG-MCG per tablet, Take 1 tablet by mouth daily, Disp: 28 tablet, Rfl: 3  •  saccharomyces boulardii (FLORASTOR) 250 mg capsule, Take 250 mg by mouth 2 (two) times a day, Disp: , Rfl:   •  Multiple Vitamin (multivitamin) tablet, Take 1 tablet by mouth daily (Patient not taking: Reported on 5/21/2023), Disp: , Rfl:     Current Allergies     Allergies as of 05/21/2023 - Reviewed 05/21/2023   Allergen Reaction Noted   • Augmentin "[amoxicillin-pot clavulanate] Hives 07/14/2022            The following portions of the patient's history were reviewed and updated as appropriate: allergies, current medications, past family history, past medical history, past social history, past surgical history and problem list      Past Medical History:   Diagnosis Date   • No known health problems        Past Surgical History:   Procedure Laterality Date   • WISDOM TOOTH EXTRACTION  2019       Family History   Problem Relation Age of Onset   • Hypertension Maternal Grandmother    • Hyperlipidemia Maternal Grandmother    • Hypertension Paternal Grandmother    • Hyperlipidemia Paternal Grandmother    • Breast cancer Neg Hx    • Cancer Neg Hx          Medications have been verified  Objective   /57   Pulse 81   Temp 97 6 °F (36 4 °C)   Resp 18   Ht 5' 5\" (1 651 m)   Wt 59 kg (130 lb)   SpO2 99%   BMI 21 63 kg/m²        Physical Exam     Physical Exam  Vitals and nursing note reviewed  Constitutional:       General: She is not in acute distress  Appearance: Normal appearance  She is not ill-appearing  HENT:      Right Ear: Tympanic membrane, ear canal and external ear normal       Left Ear: Tympanic membrane, ear canal and external ear normal       Nose: No congestion or rhinorrhea  Mouth/Throat:      Mouth: Mucous membranes are moist       Pharynx: No oropharyngeal exudate or posterior oropharyngeal erythema  Eyes:      General:         Right eye: No discharge  Left eye: No discharge  Cardiovascular:      Rate and Rhythm: Normal rate and regular rhythm  Pulses: Normal pulses  Heart sounds: Normal heart sounds  Pulmonary:      Effort: Pulmonary effort is normal  No respiratory distress  Breath sounds: Normal breath sounds  No wheezing, rhonchi or rales  Abdominal:      General: Abdomen is flat  Bowel sounds are normal  There is no distension  Palpations: Abdomen is soft  Tenderness:  There is " no abdominal tenderness  There is no guarding  Musculoskeletal:      Cervical back: Neck supple  Lymphadenopathy:      Cervical: No cervical adenopathy  Skin:     General: Skin is warm and dry  Neurological:      General: No focal deficit present  Mental Status: She is alert and oriented to person, place, and time  Cranial Nerves: Cranial nerves 2-12 are intact  Sensory: Sensation is intact  Motor: Motor function is intact  Coordination: Coordination is intact  Gait: Gait is intact

## 2023-05-21 NOTE — PATIENT INSTRUCTIONS
No signs of sinus infection on exam today  Continue with daily allergy medications  Caution with lifting, if you start to feel any worse, stop lifting until seen by your PCP/neurology  Drink plenty of fluids throughout the day  Continue with Advil as needed for pain  Obtain 7-8 hours of sleep each night  Follow up with PCP in 3-5 days, neurology when available  Proceed to  ER if symptoms worsen  Acute Headache   AMBULATORY CARE:   An acute headache  is pain or discomfort that may start suddenly and get worse quickly  You may have an acute headache only when you feel stress or eat certain foods  Other acute headache pain can happen every day, and sometimes several times a day  The cause of an acute headache may not be known  It may be triggered by stress, fatigue, hormones, food, or trauma  Common types of acute headache:   Tension headache  is the most common type of headache  These headaches typically occur in the late afternoon and go away by evening  The pain is usually mild or moderate  You may have problems tolerating bright light or loud noise  The pain is usually across the forehead or in the back of the head, often only on one side  These headaches may occur every day  Migraine headaches  cause moderate or severe pain  The headache generally lasts from 1 to 3 days and tends to come back  Pain is usually on only one side, but it may change sides  Migraines often occur in the temple, the back of the head, or behind the eye  The pain may throb or be sharp and steady  A migraine with aura  means you see or feel something before a migraine  You may see a small spot surrounded by bright zigzag lines  Other signs or symptoms may follow the aura  Cluster headache  pain is usually only on one side  It often causes severe pain, and can last for 30 minutes to 2 hours  These headaches may occur 1 or 2 times each day, more often at night  The pain may wake you      Seek care immediately if:   You have severe pain  You have numbness or weakness on one side of your face or body  You have a headache that occurs after a blow to the head, a fall, or other trauma  You have a headache, are forgetful or confused, or have trouble speaking  You have a headache, stiff neck, and a fever  Call your doctor if:   You have a constant headache and are vomiting  You have a headache each day that does not get better, even after treatment  You have changes in your headaches, or new symptoms that occur when you have a headache  You have questions or concerns about your condition or care  Treatment:   Medicine  may be given to decrease pain  The medicine your healthcare provider recommends will depend on the kind of headaches you have  You will need to take prescription headache medicines as directed to prevent a problem called rebound headache  These headaches happen with regular use of pain relievers for headache disorders  NSAIDs or acetaminophen may help some kinds of headaches  Biofeedback  may help you learn how to change stress reactions  For example, you learn to slow your heart rate when you become upset  You may also learn to prevent certain headaches by combining heat with relaxation  Cognitive behavior therapy,  or stress management, may be used with other therapies to prevent headaches  Manage your symptoms:   Apply heat or ice  on the headache area  Use a heat or ice pack  For an ice pack, you can also put crushed ice in a plastic bag  Cover the pack or bag with a towel before you apply it to your skin  Ice and heat both help decrease pain, and heat helps decrease muscle spasms  Apply heat for 20 to 30 minutes every 2 hours  Apply ice for 15 to 20 minutes every hour  Apply heat or ice for as long and for as many days as directed  You may alternate heat and ice  Relax your muscles  Lie down in a comfortable position and close your eyes  Relax your muscles slowly   Start at your toes and work your way up your body  Keep a record of your headaches  Write down when your headaches start and stop  Include your symptoms and what you were doing when the headache began  Record what you ate or drank for 24 hours before the headache started  Describe the pain and where it hurts  Keep track of what you did to treat your headache and if it worked  Prevent an acute headache:   Avoid anything that triggers an acute headache  Examples include exposure to chemicals, going to high altitude, or not getting enough sleep  Create a regular sleep routine  Go to sleep at the same time and wake up at the same time each day  Do not use electronic devices before bedtime  These may trigger a headache or prevent you from sleeping well  Do not smoke  Nicotine and other chemicals in cigarettes and cigars can trigger an acute headache or make it worse  Ask your healthcare provider for information if you currently smoke and need help to quit  E-cigarettes or smokeless tobacco still contain nicotine  Talk to your healthcare provider before you use these products  Limit alcohol as directed  Alcohol can trigger an acute headache or make it worse  If you have cluster headaches, do not drink alcohol during an episode  For other types of headaches, ask your healthcare provider if it is safe for you to drink alcohol  Ask how much is safe for you to drink, and how often  Exercise as directed  Exercise can reduce tension and help with headache pain  Aim for 30 minutes of physical activity on most days of the week  Your healthcare provider can help you create an exercise plan  Eat a variety of healthy foods  Healthy foods include fruits, vegetables, low-fat dairy products, lean meats, fish, whole grains, and cooked beans  Your healthcare provider or dietitian can help you create meals plans if you need to avoid foods that trigger headaches      Follow up with your healthcare provider as directed:  Bring your headache record with you when you see your healthcare provider  Write down your questions so you remember to ask them during your visits  © Copyright [employer] 2022 Information is for End User's use only and may not be sold, redistributed or otherwise used for commercial purposes  The above information is an  only  It is not intended as medical advice for individual conditions or treatments  Talk to your doctor, nurse or pharmacist before following any medical regimen to see if it is safe and effective for you

## 2023-06-10 ENCOUNTER — HOSPITAL ENCOUNTER (EMERGENCY)
Facility: HOSPITAL | Age: 23
Discharge: HOME | End: 2023-06-10
Attending: EMERGENCY MEDICINE
Payer: COMMERCIAL

## 2023-06-10 VITALS
DIASTOLIC BLOOD PRESSURE: 57 MMHG | HEART RATE: 99 BPM | OXYGEN SATURATION: 97 % | TEMPERATURE: 98.2 F | RESPIRATION RATE: 16 BRPM | SYSTOLIC BLOOD PRESSURE: 128 MMHG

## 2023-06-10 DIAGNOSIS — N75.0 BARTHOLIN GLAND CYST: Primary | ICD-10-CM

## 2023-06-10 PROCEDURE — 99284 EMERGENCY DEPT VISIT MOD MDM: CPT

## 2023-06-10 RX ORDER — LIDOCAINE HYDROCHLORIDE AND EPINEPHRINE 10; 10 MG/ML; UG/ML
5 INJECTION, SOLUTION INFILTRATION; PERINEURAL ONCE
Status: COMPLETED | OUTPATIENT
Start: 2023-06-10 | End: 2023-06-10

## 2023-06-10 RX ADMIN — LIDOCAINE HYDROCHLORIDE,EPINEPHRINE BITARTRATE 1 ML: 10; .01 INJECTION, SOLUTION INFILTRATION; PERINEURAL at 13:44

## 2023-06-10 NOTE — ED ATTENDING ATTESTATION
6/10/2023  I, Deven Ruiz MD, saw and evaluated the patient  I have discussed the patient with the resident/non-physician practitioner and agree with the resident's/non-physician practitioner's findings, Plan of Care, and MDM as documented in the resident's/non-physician practitioner's note, except where noted  All available labs and Radiology studies were reviewed  I was present for key portions of any procedure(s) performed by the resident/non-physician practitioner and I was immediately available to provide assistance  At this point I agree with the current assessment done in the Emergency Department  I have conducted an independent evaluation of this patient a history and physical is as follows:  Patient reports a 3-day history of a painful swollen area on the left labia similar to prior episodes of Bartholin cysts that she has had in the past   The patient has had the cyst drained in the past   The patient denies any other complaint  Specifically the patient denies any urinary symptoms or other vaginal complaint  Physical exam demonstrates a pleasant alert nontoxic female no acute distress  HEENT exam was normal   Abdomen was soft and nontender  There was a large tender fluctuant area of the left labia without overlying erythema or warmth    The remainder the genital exam was normal   ED Course         Critical Care Time  Procedures

## 2023-06-10 NOTE — ED PROVIDER NOTES
History  Chief Complaint   Patient presents with   • Abscess     Pt has recurrent bartholin abscess; about 3 days ago noticed swelling/pain     50-year-old female presents for evaluation with left labial swelling  Patient states that she has a history of Bartholin gland cysts, and this feels similar to what she has experienced previously  Patient states that this cyst has been present for approximately 3 days  She states that over that time it has become larger and more painful  She denies any drainage from the area  She denies any associated inguinal swelling, fevers, or chills  Patient states that she has had the cyst drained before, but she has not talked to her GYN regarding these cysts  Prior to Admission Medications   Prescriptions Last Dose Informant Patient Reported? Taking?    Multiple Vitamin (multivitamin) tablet  Self Yes No   Sig: Take 1 tablet by mouth daily   Patient not taking: Reported on 2023   calcium carbonate (TUMS) 500 mg chewable tablet  Self Yes No   Sig: Chew 1 tablet daily   etonogestrel (NEXPLANON) subdermal implant   No No   Si mg by Subdermal route once for 1 dose   famotidine (PEPCID) 20 mg tablet   No No   Sig: Take 1 tablet (20 mg total) by mouth 2 (two) times a day   norgestimate-ethinyl estradiol (ORTHO-CYCLEN) 0 25-35 MG-MCG per tablet   No No   Sig: Take 1 tablet by mouth daily   saccharomyces boulardii (FLORASTOR) 250 mg capsule   Yes No   Sig: Take 250 mg by mouth 2 (two) times a day      Facility-Administered Medications: None       Past Medical History:   Diagnosis Date   • No known health problems        Past Surgical History:   Procedure Laterality Date   • WISDOM TOOTH EXTRACTION         Family History   Problem Relation Age of Onset   • Hypertension Maternal Grandmother    • Hyperlipidemia Maternal Grandmother    • Hypertension Paternal Grandmother    • Hyperlipidemia Paternal Grandmother    • Breast cancer Neg Hx    • Cancer Neg Hx      I have reviewed and agree with the history as documented  E-Cigarette/Vaping   • E-Cigarette Use Never User      E-Cigarette/Vaping Substances     Social History     Tobacco Use   • Smoking status: Never   • Smokeless tobacco: Never   Vaping Use   • Vaping Use: Never used   Substance Use Topics   • Alcohol use: Yes     Comment: Rare    • Drug use: Not Currently     Types: Marijuana     Comment: last used marijuana 3/2021        Review of Systems   Constitutional: Negative for chills and fever  Gastrointestinal: Negative for abdominal pain, nausea and vomiting  Genitourinary: Positive for genital sores  Negative for vaginal discharge  All other systems reviewed and are negative  Physical Exam  ED Triage Vitals   Temperature Pulse Respirations Blood Pressure SpO2   06/10/23 1252 06/10/23 1252 06/10/23 1252 06/10/23 1252 06/10/23 1252   98 2 °F (36 8 °C) (!) 119 16 137/64 97 %      Temp Source Heart Rate Source Patient Position - Orthostatic VS BP Location FiO2 (%)   06/10/23 1252 06/10/23 1252 06/10/23 1345 06/10/23 1252 --   Temporal Monitor Sitting Right arm       Pain Score       06/10/23 1252       8             Orthostatic Vital Signs  Vitals:    06/10/23 1252 06/10/23 1345   BP: 137/64 128/57   Pulse: (!) 119 99   Patient Position - Orthostatic VS:  Sitting       Physical Exam  Vitals and nursing note reviewed  Exam conducted with a chaperone present  Constitutional:       General: She is awake  She is not in acute distress  Appearance: She is not toxic-appearing  HENT:      Head: Normocephalic and atraumatic  Eyes:      General: Vision grossly intact  Gaze aligned appropriately  Cardiovascular:      Rate and Rhythm: Normal rate and regular rhythm  Pulmonary:      Effort: Pulmonary effort is normal  No respiratory distress  Genitourinary:     Comments: Left labia with area of swelling and fluctuance at the inferior aspect  No overlying erythema  No active drainage     Musculoskeletal: Cervical back: Full passive range of motion without pain and neck supple  Skin:     General: Skin is warm and dry  Neurological:      General: No focal deficit present  Mental Status: She is alert and oriented to person, place, and time  ED Medications  Medications   lidocaine-epinephrine (XYLOCAINE/EPINEPHRINE) 1 %-1:100,000 injection 5 mL (1 mL Infiltration Given by Other 6/10/23 1344)       Diagnostic Studies  Results Reviewed     None                 No orders to display         Procedures  Incision and drain    Date/Time: 6/10/2023 1:40 PM    Performed by: Gurmeet Skinner DO  Authorized by: Gurmeet Skinner DO  Universal Protocol:  Consent: Verbal consent obtained  Risks and benefits: risks, benefits and alternatives were discussed  Consent given by: patient  Required items: required blood products, implants, devices, and special equipment available  Patient identity confirmed: arm band      Patient location:  ED  Location:     Type:  Bartholin cyst    Location:  Anogenital    Anogenital location:  Bartholin's gland  Pre-procedure details:     Procedure prep: alcohol wipe  Anesthesia (see MAR for exact dosages): Anesthesia method:  Local infiltration    Local anesthetic:  Lidocaine 1% WITH epi  Procedure details:     Complexity:  Simple    Needle aspiration: no      Incision types:  Stab incision    Scalpel blade:  11    Incision depth:  Subcutaneous    Wound management:  Probed and deloculated    Drainage:  Bloody and serosanguinous    Drainage amount:  Scant    Wound treatment:  Packing placed    Packing materials:  1/4 in gauze  Post-procedure details:     Patient tolerance of procedure: Tolerated well, no immediate complications          ED Course                             SBIRT 20yo+    Flowsheet Row Most Recent Value   Initial Alcohol Screen: US AUDIT-C     1  How often do you have a drink containing alcohol? 0 Filed at: 06/10/2023 1254   2   How many drinks containing alcohol do you have on a typical day you are drinking? 0 Filed at: 06/10/2023 1254   3a  Male UNDER 65: How often do you have five or more drinks on one occasion? 0 Filed at: 06/10/2023 1254   3b  FEMALE Any Age, or MALE 65+: How often do you have 4 or more drinks on one occassion? 0 Filed at: 06/10/2023 1254   Audit-C Score 0 Filed at: 06/10/2023 1254   PIERCE: How many times in the past year have you    Used an illegal drug or used a prescription medication for non-medical reasons? Never Filed at: 06/10/2023 1254                Medical Decision Making  71-year-old female presents for evaluation with 3 days of left labial swelling and pain  On exam, patient initially tachycardic, otherwise with normal vitals, in no acute distress  Left labia noted to have an area of swelling and induration at the inferior aspect, but no overlying erythema or active drainage from the area  Exam consistent with a Bartholin gland cyst   Area was numbed with lidocaine, and was incised and drained with return of bloody/serosanguineous fluid  No purulent drainage  Small amount of quarter inch packing was placed to keep the area open to allow for drainage  Patient reported improvement in symptoms after I&D  Patient was given symptomatic care instructions, and was told to follow-up with her OB/GYN for further evaluation  Patient was discharged home in stable condition with strict ED return precautions  Bartholin gland cyst: acute illness or injury  Risk  Prescription drug management              Disposition  Final diagnoses:   Bartholin gland cyst     Time reflects when diagnosis was documented in both MDM as applicable and the Disposition within this note     Time User Action Codes Description Comment    6/10/2023  1:43 PM Ana Woodson Add [N75 0] Bartholin gland cyst       ED Disposition     ED Disposition   Discharge    Condition   Stable    Date/Time   Sat Sumit 10, 2023  1:43 PM    Comment   Kanwal Jewell discharge to home/self care                Follow-up Information     Follow up With Specialties Details Why Contact Info Additional [address] [facility] Schedule an appointment as soon as possible for a visit in 1 week  [address] [phone]  [facility] [address], [place], [place], [address]    Hue Isbell MD Family Medicine Schedule an appointment as soon as possible for a visit  As needed [address]  [address] [facility] [address]  [address] [facility] Emergency Department Emergency Medicine Go to  If symptoms worsen [address] [phone] [address] [facility] [address] [facility] Emergency Department, [address], [place], [place], [address]          Discharge Medication List as of 6/10/2023  1:45 PM      CONTINUE these medications which have NOT CHANGED    Details   calcium carbonate (TUMS) 500 mg chewable tablet Chew 1 tablet daily, Historical Med      etonogestrel (NEXPLANON) subdermal implant 68 mg by Subdermal route once for 1 dose, Starting Wed 5/19/2021, No Print      famotidine (PEPCID) 20 mg tablet Take 1 tablet (20 mg total) by mouth 2 (two) times a day, Starting Wed 8/11/2021, Normal      Multiple Vitamin (multivitamin) tablet Take 1 tablet by mouth daily, Historical Med      norgestimate-ethinyl estradiol (ORTHO-CYCLEN) 0 25-35 MG-MCG per tablet Take 1 tablet by mouth daily, Starting Wed 1/25/2023, Normal      saccharomyces boulardii (FLORASTOR) 250 mg capsule Take 250 mg by mouth 2 (two) times a day, Historical Med           No discharge procedures on file  PDMP Review     None           ED Provider  Attending physically available and evaluated Ibis Plan  I managed the patient along with the ED Attending      Electronically Signed by Wing Irene,   06/10/23 1546

## 2023-06-11 ENCOUNTER — HOSPITAL ENCOUNTER (EMERGENCY)
Facility: HOSPITAL | Age: 23
Discharge: HOME | End: 2023-06-11
Attending: EMERGENCY MEDICINE
Payer: COMMERCIAL

## 2023-06-11 VITALS
OXYGEN SATURATION: 98 % | RESPIRATION RATE: 18 BRPM | HEIGHT: 65 IN | DIASTOLIC BLOOD PRESSURE: 69 MMHG | TEMPERATURE: 98.8 F | SYSTOLIC BLOOD PRESSURE: 120 MMHG | HEART RATE: 108 BPM | BODY MASS INDEX: 22.49 KG/M2 | WEIGHT: 135 LBS

## 2023-06-11 DIAGNOSIS — N75.1 BARTHOLIN'S GLAND ABSCESS: Primary | ICD-10-CM

## 2023-06-11 PROCEDURE — 99284 EMERGENCY DEPT VISIT MOD MDM: CPT

## 2023-06-11 RX ORDER — CEPHALEXIN 500 MG/1
500 CAPSULE ORAL EVERY 6 HOURS SCHEDULED
Qty: 20 CAPSULE | Refills: 0 | Status: SHIPPED | OUTPATIENT
Start: 2023-06-11 | End: 2023-06-16

## 2023-06-11 RX ORDER — LIDOCAINE HYDROCHLORIDE AND EPINEPHRINE 10; 10 MG/ML; UG/ML
5 INJECTION, SOLUTION INFILTRATION; PERINEURAL ONCE
Status: COMPLETED | OUTPATIENT
Start: 2023-06-11 | End: 2023-06-11

## 2023-06-11 RX ADMIN — LIDOCAINE HYDROCHLORIDE,EPINEPHRINE BITARTRATE 5 ML: 10; .01 INJECTION, SOLUTION INFILTRATION; PERINEURAL at 15:06

## 2023-06-11 NOTE — ED ATTENDING ATTESTATION
6/11/2023  ISherry MD, saw and evaluated the patient  I have discussed the patient with the resident/non-physician practitioner and agree with the resident's/non-physician practitioner's findings, Plan of Care, and MDM as documented in the resident's/non-physician practitioner's note, except where noted  All available labs and Radiology studies were reviewed  I was present for key portions of any procedure(s) performed by the resident/non-physician practitioner and I was immediately available to provide assistance  At this point I agree with the current assessment done in the Emergency Department    I have conducted an independent evaluation of this patient a history and physical is as follows:  Pt was seen yesterday for bartholin gland cyst Pt initially felt better since last night has gotten worse PE: L sided bartholin abscess noted MDM: will I and D     I and d on mucosal inner surface with large amount of purulence Pt refused word catheter  Packed Will placed on keflex  ED Course         Critical Care Time  Procedures

## 2023-06-11 NOTE — ED PROVIDER NOTES
History  Chief Complaint   Patient presents with   • Cyst     Patient presents with cyst that was drained yesterday  Patient states she woke up in more pain  Denies any fevers/chills  Denies excess drainage from site  Patient is a 36-year-old female with history of Bartholin gland cyst who presents for patient tomorrow with cyst   Patient states that she was seen in the summer department yesterday for the same problem  She states that she has had a few days of swelling of her left labia that has been worsening  She states that it is painful that she denies any draining, fevers, chills, nausea, vomiting, vaginal discharge, or vaginal bleeding  She states she has a history of Bartholin cysts and that her last one was approximately 2 years ago  Prior to Admission Medications   Prescriptions Last Dose Informant Patient Reported? Taking?    Multiple Vitamin (multivitamin) tablet  Self Yes No   Sig: Take 1 tablet by mouth daily   Patient not taking: Reported on 2023   calcium carbonate (TUMS) 500 mg chewable tablet  Self Yes No   Sig: Chew 1 tablet daily   etonogestrel (NEXPLANON) subdermal implant   No No   Si mg by Subdermal route once for 1 dose   famotidine (PEPCID) 20 mg tablet   No No   Sig: Take 1 tablet (20 mg total) by mouth 2 (two) times a day   norgestimate-ethinyl estradiol (ORTHO-CYCLEN) 0 25-35 MG-MCG per tablet   No No   Sig: Take 1 tablet by mouth daily   saccharomyces boulardii (FLORASTOR) 250 mg capsule   Yes No   Sig: Take 250 mg by mouth 2 (two) times a day      Facility-Administered Medications: None       Past Medical History:   Diagnosis Date   • No known health problems        Past Surgical History:   Procedure Laterality Date   • WISDOM TOOTH EXTRACTION  2019       Family History   Problem Relation Age of Onset   • Hypertension Maternal Grandmother    • Hyperlipidemia Maternal Grandmother    • Hypertension Paternal Grandmother    • Hyperlipidemia Paternal Grandmother    • Breast cancer Neg Hx    • Cancer Neg Hx      I have reviewed and agree with the history as documented  E-Cigarette/Vaping   • E-Cigarette Use Never User      E-Cigarette/Vaping Substances     Social History     Tobacco Use   • Smoking status: Never   • Smokeless tobacco: Never   Vaping Use   • Vaping Use: Never used   Substance Use Topics   • Alcohol use: Yes     Comment: Rare    • Drug use: Not Currently     Types: Marijuana     Comment: last used marijuana 3/2021        Review of Systems   Constitutional: Negative for chills and fever  HENT: Negative for congestion, rhinorrhea and sore throat  Eyes: Negative for pain and visual disturbance  Respiratory: Negative for cough and shortness of breath  Cardiovascular: Negative for chest pain and palpitations  Gastrointestinal: Negative for abdominal pain, constipation, diarrhea, nausea and vomiting  Genitourinary: Positive for vaginal pain  Negative for dysuria, hematuria, vaginal bleeding and vaginal discharge  Musculoskeletal: Negative for back pain and neck pain  Skin: Negative for color change and rash  Neurological: Negative for weakness and numbness  All other systems reviewed and are negative  Physical Exam  ED Triage Vitals [06/11/23 1211]   Temperature Pulse Respirations Blood Pressure SpO2   98 8 °F (37 1 °C) (!) 108 18 120/69 98 %      Temp Source Heart Rate Source Patient Position - Orthostatic VS BP Location FiO2 (%)   Temporal Monitor Sitting Right arm --      Pain Score       8             Orthostatic Vital Signs  Vitals:    06/11/23 1211   BP: 120/69   Pulse: (!) 108   Patient Position - Orthostatic VS: Sitting       Physical Exam  Vitals and nursing note reviewed  Exam conducted with a chaperone present  Constitutional:       General: She is not in acute distress  Appearance: Normal appearance  She is well-developed and normal weight  She is not ill-appearing, toxic-appearing or diaphoretic     HENT:      Head: Normocephalic and atraumatic  Right Ear: External ear normal       Left Ear: External ear normal       Nose: Nose normal  No congestion or rhinorrhea  Mouth/Throat:      Mouth: Mucous membranes are moist       Pharynx: Oropharynx is clear  No oropharyngeal exudate or posterior oropharyngeal erythema  Eyes:      General: No scleral icterus  Right eye: No discharge  Left eye: No discharge  Extraocular Movements: Extraocular movements intact  Conjunctiva/sclera: Conjunctivae normal       Pupils: Pupils are equal, round, and reactive to light  Cardiovascular:      Rate and Rhythm: Normal rate and regular rhythm  Pulses: Normal pulses  Heart sounds: Normal heart sounds  No murmur heard  No friction rub  No gallop  Pulmonary:      Effort: Pulmonary effort is normal  No respiratory distress  Breath sounds: Normal breath sounds  No stridor  No wheezing, rhonchi or rales  Abdominal:      General: Abdomen is flat  Bowel sounds are normal  There is no distension  Palpations: Abdomen is soft  Tenderness: There is no abdominal tenderness  There is no right CVA tenderness or guarding  Genitourinary:     Comments: There is swelling and tenderness palpation of the left labium at approximately the 5 o'clock position  There is no drainage  There is a small well-healed incision on the anterior labium  Musculoskeletal:         General: No tenderness  Cervical back: Neck supple  Right lower leg: No edema  Left lower leg: No edema  Skin:     General: Skin is warm and dry  Capillary Refill: Capillary refill takes less than 2 seconds  Coloration: Skin is not jaundiced or pale  Neurological:      General: No focal deficit present  Mental Status: She is alert and oriented to person, place, and time  Cranial Nerves: No cranial nerve deficit  Sensory: No sensory deficit  Motor: No weakness     Psychiatric:         Mood "and Affect: Mood normal          Behavior: Behavior normal          ED Medications  Medications   lidocaine-epinephrine (XYLOCAINE/EPINEPHRINE) 1 %-1:100,000 injection 5 mL (5 mL Infiltration Given by Other 6/11/23 1504)       Diagnostic Studies  Results Reviewed     None                 No orders to display         Procedures  Incision and drain    Date/Time: 6/11/2023 3:08 PM    Performed by: Jorden Herring MD  Authorized by: Jorden Herring MD  Universal Protocol:  Procedure performed by: (Dr Jenny Guillen)  Consent: Verbal consent obtained  Consent given by: patient  Patient identity confirmed: verbally with patient      Patient location:  ED  Location:     Type:  Bartholin cyst    Size:  4 cm    Location:  Anogenital    Anogenital location:  Bartholin's gland  Pre-procedure details:     Skin preparation:  Alcohol wipe  Anesthesia (see MAR for exact dosages): Anesthesia method:  Local infiltration    Local anesthetic:  Lidocaine 1% WITH epi  Procedure details:     Complexity:  Simple    Needle aspiration: no      Incision types:  Stab incision    Scalpel blade:  11    Approach:  Puncture    Incision depth:  Submucosal    Drainage:  Purulent    Drainage amount:  Copious    Wound treatment:  Packing placed    Packing materials:  1/4 in gauze    Amount 1/4\":  1 inch  Post-procedure details:     Patient tolerance of procedure: Tolerated well, no immediate complications          ED Course                             SBIRT 22yo+    Flowsheet Row Most Recent Value   Initial Alcohol Screen: US AUDIT-C     1  How often do you have a drink containing alcohol? 0 Filed at: 06/11/2023 1213   2  How many drinks containing alcohol do you have on a typical day you are drinking? 0 Filed at: 06/11/2023 1213   3a  Male UNDER 65: How often do you have five or more drinks on one occasion? 0 Filed at: 06/11/2023 1213   3b  FEMALE Any Age, or MALE 65+: How often do you have 4 or more drinks on one occassion?  0 Filed at: " 06/11/2023 1213   Audit-C Score 0 Filed at: 06/11/2023 1213   PIERCE: How many times in the past year have you    Used an illegal drug or used a prescription medication for non-medical reasons? Never Filed at: 06/11/2023 1213                Medical Decision Making  Patient is a 60-year-old female with history of Bartholin gland cyst who presents with cyst     Patient's history and exam consistent with Bartholin gland cyst   No signs of complications of this infection such as fevers, systemic symptoms, or any drainage  She does state that the cyst seems larger than yesterday  Will attempt repeat incision and drain  Incision and drainage was performed following topical anesthesia with lidocaine  There was copious drainage of purulent fluid  Approximately 1 inch of quarter inch packing gauze was placed  Initially discussed placing Word catheter however patient states that in the past is caused her a lot of discomfort than true for packing with gauze  Will prescribe patient Keflex for possible recurrence  Advised patient to take Tylenol Motrin for pain  Advised patient to follow-up with her OB/GYN to be reassessed  Return precautions given, all questions answered  Risk  Prescription drug management  Disposition  Final diagnoses:   Bartholin's gland abscess     Time reflects when diagnosis was documented in both MDM as applicable and the Disposition within this note     Time User Action Codes Description Comment    6/11/2023  3:00 PM Nakia Elliott [N75 [id number]] Bartholin's gland abscess       ED Disposition     ED Disposition   Discharge    Condition   Stable    Date/Time   Lacey Jun 11, 2023  2:59 PM    [address] [facility] discharge to home/self care                 Follow-up Information     Follow up With Specialties Details Why Contact Info Additional Information    Ileana Mustafa MD Family Medicine Call  As needed [address] [facility] [facility] [address]  [address] [facility]  Frank [address]  [address]  [facility] [address]  BRADLEY CAMARENA JR  [facility] Emergency Department Emergency Medicine Go to  If symptoms worsen or if you have any other specific concerns [address] [phone] [address] [facility] [address] Emergency Department, [address] [employer] [facility], [address]          Discharge Medication List as of 6/11/2023  3:02 PM      START taking these medications    Details   cephalexin (KEFLEX) 500 mg capsule Take 1 capsule (500 mg total) by mouth every 6 (six) hours for 5 days, Starting Sun 6/11/2023, Until Fri 6/16/2023, Normal         CONTINUE these medications which have NOT CHANGED    Details   calcium carbonate (TUMS) 500 mg chewable tablet Chew 1 tablet daily, Historical Med      etonogestrel (NEXPLANON) subdermal implant 68 mg by Subdermal route once for 1 dose, Starting Wed 5/19/2021, No Print      famotidine (PEPCID) 20 mg tablet Take 1 tablet (20 mg total) by mouth 2 (two) times a day, Starting Wed 8/11/2021, Normal      Multiple Vitamin (multivitamin) tablet Take 1 tablet by mouth daily, Historical Med      norgestimate-ethinyl estradiol (ORTHO-CYCLEN) 0 25-35 MG-MCG per tablet Take 1 tablet by mouth daily, Starting Wed 1/25/2023, Normal      saccharomyces boulardii (FLORASTOR) 250 mg capsule Take 250 mg by mouth 2 (two) times a day, Historical Med           No discharge procedures on file  PDMP Review     None           ED Provider  Attending physically available and evaluated Carol Ley I managed the patient along with the ED Attending      Electronically Signed by         Lucile Blizzard, MD  06/11/23 4048

## 2023-06-11 NOTE — DISCHARGE INSTRUCTIONS
Please take Keflex as prescribed  Please follow-up with your OB/GYN  If your packing falls out do not attempt to replace it  Please take Tylenol Motrin for any discomfort  Please return to the emergency department for any new or concerning symptoms including fevers, chills, vaginal discharge, or recurrence of your infection

## 2023-08-15 ENCOUNTER — CLINICAL SUPPORT (OUTPATIENT)
Dept: FAMILY MEDICINE CLINIC | Facility: CLINIC | Age: 23
End: 2023-08-15

## 2023-08-15 DIAGNOSIS — Z11.1 ENCOUNTER FOR PPD TEST: Primary | ICD-10-CM

## 2023-08-15 PROCEDURE — 86580 TB INTRADERMAL TEST: CPT

## 2023-08-17 ENCOUNTER — TELEPHONE (OUTPATIENT)
Dept: FAMILY MEDICINE CLINIC | Facility: CLINIC | Age: 23
End: 2023-08-17

## 2023-08-17 ENCOUNTER — CLINICAL SUPPORT (OUTPATIENT)
Dept: FAMILY MEDICINE CLINIC | Facility: CLINIC | Age: 23
End: 2023-08-17

## 2023-08-17 DIAGNOSIS — Z11.1 ENCOUNTER FOR PPD SKIN TEST READING: Primary | ICD-10-CM

## 2023-08-17 LAB
INDURATION: 0 MM
TB SKIN TEST: NEGATIVE

## 2023-08-17 NOTE — TELEPHONE ENCOUNTER
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 Will place in [address] [place] [address] since physical was completed by [facility].

## 2023-08-18 ENCOUNTER — TELEPHONE (OUTPATIENT)
Dept: FAMILY MEDICINE CLINIC | Facility: CLINIC | Age: 23
End: 2023-08-18

## 2023-08-18 NOTE — TELEPHONE ENCOUNTER
----- Message from Chang Adair, [address] [facility] sent at 8/17/2023  2:53 PM EDT -----  I got a form for this patient for an employee physical. I last saw the pt 9 months ago. I require a physical visit within the last 6 months when completing a form.  Please schedule with PCP for employee physical.

## 2023-08-24 ENCOUNTER — OFFICE VISIT (OUTPATIENT)
Dept: FAMILY MEDICINE CLINIC | Facility: CLINIC | Age: 23
End: 2023-08-24

## 2023-08-24 VITALS
OXYGEN SATURATION: 99 % | BODY MASS INDEX: 23.59 KG/M2 | WEIGHT: 141.6 LBS | DIASTOLIC BLOOD PRESSURE: 70 MMHG | TEMPERATURE: 97.8 F | HEART RATE: 86 BPM | HEIGHT: 65 IN | RESPIRATION RATE: 17 BRPM | SYSTOLIC BLOOD PRESSURE: 104 MMHG

## 2023-08-24 DIAGNOSIS — Z02.1 PRE-EMPLOYMENT EXAMINATION: Primary | ICD-10-CM

## 2023-08-24 NOTE — ASSESSMENT & PLAN NOTE
Here for preemployment physical   Vaccinations up to date   PPD negative   Form filled and returned to patient

## 2023-08-24 NOTE — PROGRESS NOTES
Name: Sloane Yip      : 2000      MRN: [id number]  Encounter Provider: Gabriel Anderson MD  Encounter Date: 2023   Encounter department: [address] [facility]     1. Pre-employment examination  Assessment & Plan:  Here for preemployment physical   Vaccinations up to date   PPD negative   Form filled and returned to patient           Subjective     Sloane Yip is a 26 y.o. female with no sig pmhx presenting today for preemployment physical.     No current complaints. Review of Systems   Constitutional: Negative for chills and fever. HENT: Negative for ear pain and sore throat. Eyes: Negative for pain and visual disturbance. Respiratory: Negative for cough and shortness of breath. Cardiovascular: Negative for chest pain and palpitations. Gastrointestinal: Negative for abdominal pain and vomiting. Genitourinary: Negative for dysuria and hematuria. Musculoskeletal: Negative for arthralgias and back pain. Skin: Negative for color change and rash. Neurological: Negative for seizures and syncope. All other systems reviewed and are negative. Past Medical History:   Diagnosis Date   • No known health problems      Past Surgical History:   Procedure Laterality Date   • WISDOM TOOTH EXTRACTION  2019     Family History   Problem Relation Age of Onset   • Hypertension Maternal Grandmother    • Hyperlipidemia Maternal Grandmother    • Hypertension Paternal Grandmother    • Hyperlipidemia Paternal Grandmother    • Breast cancer Neg Hx    • Cancer Neg Hx      Social History     Socioeconomic History   • Marital status: Single     Spouse name: None   • Number of children: None   • Years of education: None   • Highest education level: None   Occupational History   • None   Tobacco Use   • Smoking status: Never   • Smokeless tobacco: Never   Vaping Use   • Vaping Use: Never used   Substance and Sexual Activity   • Alcohol use:  Yes Comment: Rare    • Drug use: Not Currently     Types: Marijuana     Comment: last used marijuana 3/2021   • Sexual activity: Yes     Partners: Male     Birth control/protection: OCP   Other Topics Concern   • None   Social History Narrative   • None     Social Determinants of Health     Financial Resource Strain: Low Risk  (8/24/2023)    Overall Financial Resource Strain (CARDIA)    • Difficulty of Paying Living Expenses: Not hard at all   Food Insecurity: No Food Insecurity (8/24/2023)    Hunger Vital Sign    • Worried About Running Out of Food in the Last Year: Never true    • Ran Out of Food in the Last Year: Never true   Transportation Needs: No Transportation Needs (8/24/2023)    PRAPARE - Transportation    • Lack of Transportation (Medical): No    • Lack of Transportation (Non-Medical): No   Physical Activity: Not on file   Stress: Not on file   Social Connections: Not on file   Intimate Partner Violence: Not on file   Housing Stability: Not on file     Current Outpatient Medications on File Prior to Visit   Medication Sig   • calcium carbonate (TUMS) 500 mg chewable tablet Chew 1 tablet daily   • etonogestrel (NEXPLANON) subdermal implant 68 mg by Subdermal route once for 1 dose   • famotidine (PEPCID) 20 mg tablet Take 1 tablet (20 mg total) by mouth 2 (two) times a day   • Karen 0.25-35 MG-MCG per tablet TAKE 1 TABLET BY MOUTH EVERY DAY   • Multiple Vitamin (multivitamin) tablet Take 1 tablet by mouth daily (Patient not taking: Reported on 5/21/2023)   • saccharomyces boulardii (FLORASTOR) 250 mg capsule Take 250 mg by mouth 2 (two) times a day     Allergies   Allergen Reactions   • Augmentin [Amoxicillin-Pot Clavulanate] Hives     Reports hives after 2nd dose of Augmentin. Patient has had in the past without reaction.      Immunization History   Administered Date(s) Administered   • COVID-19 PFIZER VACCINE 0.3 ML IM 05/26/2021, 06/17/2021   • DTP 2001, 01/11/2002, 10/02/2002, 08/18/2005, 10/29/2007 • DTaP 2001, 01/11/2002, 10/02/2002, 08/18/2005, 10/29/2007   • HPV Quadrivalent 09/13/2010, 11/19/2010, 03/25/2011   • Hep A, ped/adol, 2 dose 09/13/2010, 10/12/2011   • Hep B, Adolescent or Pediatric 2001, 2001, 01/07/2002   • Hepatitis A 09/13/2010, 10/12/2011   • HiB 2001, 10/02/2002, 01/17/2007, 10/29/2007   • INFLUENZA 10/16/2010, 10/12/2011, 11/05/2012, 10/29/2013, 12/03/2014, 12/23/2015   • IPV 2001, 01/07/2002, 11/09/2005, 10/29/2007   • MMR 06/24/2002, 06/26/2002, 08/18/2005   • Meningococcal B, OMV (BEXSERO) 08/11/2017, 10/26/2017   • Meningococcal MCV4, Unspecified 06/19/2012, 08/11/2017   • Meningococcal MCV4P 06/19/2012, 08/11/2017   • Pneumococcal Conjugate PCV 7 2001, 01/11/2002, 04/03/2002   • Tdap 06/19/2012, 12/21/2022   • Tuberculin Skin Test-PPD Intradermal 06/12/2017, 08/15/2023   • Varicella 06/26/2002, 09/13/2010       Objective     /70 (BP Location: Right arm, Patient Position: Sitting, Cuff Size: Standard)   Pulse 86   Temp 97.8 °F (36.6 °C) (Temporal)   Resp 17   Ht 5' 5" (1.651 m)   Wt 64.2 kg (141 lb 9.6 oz)   SpO2 99%   BMI 23.56 kg/m²     Physical Exam  Constitutional:       Appearance: Normal appearance. HENT:      Head: Normocephalic and atraumatic. Eyes:      Extraocular Movements: Extraocular movements intact. Pupils: Pupils are equal, round, and reactive to light. Cardiovascular:      Rate and Rhythm: Normal rate and regular rhythm. Pulses: Normal pulses. Heart sounds: Normal heart sounds. Pulmonary:      Effort: Pulmonary effort is normal. No respiratory distress. Breath sounds: Normal breath sounds. No stridor. No wheezing, rhonchi or rales. Abdominal:      General: Bowel sounds are normal. There is no distension. Palpations: Abdomen is soft. Tenderness: There is no abdominal tenderness. Skin:     General: Skin is warm. Capillary Refill: Capillary refill takes less than 2 seconds. Neurological:      General: No focal deficit present. Mental Status: She is alert and oriented to person, place, and time.    Psychiatric:         Mood and Affect: Mood normal.         Behavior: Behavior normal.       Arlyn Shepherd MD

## 2023-11-07 ENCOUNTER — OFFICE VISIT (OUTPATIENT)
Dept: URGENT CARE | Facility: CLINIC | Age: 23
End: 2023-11-07
Payer: COMMERCIAL

## 2023-11-07 VITALS
HEART RATE: 89 BPM | RESPIRATION RATE: 16 BRPM | OXYGEN SATURATION: 99 % | TEMPERATURE: 98.5 F | DIASTOLIC BLOOD PRESSURE: 55 MMHG | SYSTOLIC BLOOD PRESSURE: 115 MMHG

## 2023-11-07 DIAGNOSIS — L60.0 INGROWN RIGHT GREATER TOENAIL: Primary | ICD-10-CM

## 2023-11-07 PROCEDURE — 99213 OFFICE O/P EST LOW 20 MIN: CPT

## 2023-11-07 RX ORDER — IBUPROFEN 200 MG
TABLET ORAL 3 TIMES DAILY
Qty: 3.5 G | Refills: 0 | Status: SHIPPED | OUTPATIENT
Start: 2023-11-07

## 2023-11-07 NOTE — PATIENT INSTRUCTIONS
Use antibiotic ointment as prescribed. Use Motrin for discomfort. Soak ingrown nail in warm water for 20 minutes, 2-3 times each day, then gently lift nail above the skin. Referred to podiatry for further follow up if needed. Follow up with PCP in 3-5 days. Proceed to  ER if symptoms worsen. Ingrown Nail   AMBULATORY CARE:   An ingrown nail  is when the edge of your fingernail or toenail grows into the skin next to it. The most common cause is when nails are trimmed too short. Common symptoms include the following:   Painful, red, and swollen skin around your nail    Sharp pain when you walk    Pus around your cuticle (skin around your nail)    Seek care immediately if:   You have a red streak running up your leg or arm. Contact your healthcare provider if:   Your pain is getting worse. Your nail and skin are more swollen or start to drain pus. You have a fever or chills. Your ingrown nail is not better in 7 days. You have questions or concerns about your condition or care. Medicines:   Acetaminophen  decreases pain and can be bought without a doctor's order. Ask how much to take and how often to take it. Follow directions. Acetaminophen can cause liver damage if not taken correctly. NSAIDs , such as ibuprofen, help decrease swelling, pain, and fever. This medicine is available with or without a doctor's order. NSAIDs can cause stomach bleeding or kidney problems in certain people. If you take blood thinner medicine, always ask your healthcare provider if NSAIDs are safe for you. Always read the medicine label and follow directions. Antibiotics  help treat or prevent a bacterial infection. They may be given as an ointment, pill, or both. Take your medicine as directed. Contact your healthcare provider if you think your medicine is not helping or if you have side effects. Tell your provider if you are allergic to any medicine.  Keep a list of the medicines, vitamins, and herbs you take. Include the amounts, and when and why you take them. Bring the list or the pill bottles to follow-up visits. Carry your medicine list with you in case of an emergency. Self-care:   Soak and lift the nail. Soak your ingrown nail in warm water for 20 minutes, 2 to 3 times each day. Then gently lift the edge of the ingrown nail away from the skin. Wedge a small piece of cotton or gauze under the corner of the nail. You can also put dental floss under the nail to lift the edge away from the skin. This may help keep the nail from growing into the skin. Keep your nails clean and dry. Wash your hands and feet with soap and water. Pat dry with a clean towel. Dry in between each toe. Do not put lotion between your toes. Prevent another ingrown nail:   Carefully trim your nails. Cut your nails straight across. Do not cut them too short. Lightly file the nail corners if you have sharp edges. Do not round your nails. Do not rip or tear off the tips of your nails. This may cause your nail edge to grow into the skin. Use clippers, not nail scissors. Wear shoes and socks that fit well. Make sure they are not too tight. You may need to wear a shoe with the toe cut out, such as sandals, until your ingrown toenail heals. Do not wear shoes that have pointed toes or heels that are more than 2 inches high. Do not wear tight hose or socks. Wear socks that pull moisture away from your feet, such as cotton-acrylic blends. Inspect your nails daily. Look for signs of an ingrown nail. Manage problems early so the nail does not become infected. Follow up with your healthcare provider as directed: You may be referred to a podiatrist. Write down your questions so you remember to ask them during your visits. © Copyright [employer] 2023 Information is for End User's use only and may not be sold, redistributed or otherwise used for commercial purposes. The above information is an  only. It is not intended as medical advice for individual conditions or treatments. Talk to your doctor, nurse or pharmacist before following any medical regimen to see if it is safe and effective for you.

## 2023-11-07 NOTE — PROGRESS NOTES
[facility]        NAME: Jeffry Maza is a 26 y.o. female  : 2000    MRN: [id number]  DATE: 2023  TIME: 12:09 PM    Assessment and Plan   Ingrown right greater toenail [L60.0]  1. Ingrown right greater toenail  neomycin-bacitracin-polymyxin (NEOSPORIN) 5-400-5,000 ointment    Ambulatory Referral to Podiatry            Patient Instructions     Use antibiotic ointment as prescribed. Use Motrin for discomfort. Soak ingrown nail in warm water for 20 minutes, 2-3 times each day, then gently lift nail above the skin. Referred to podiatry for further follow up if needed. Follow up with PCP in 3-5 days. Proceed to  ER if symptoms worsen. Chief Complaint     Chief Complaint   Patient presents with    Ingrown Toenail     Pt reports ingrown big toenail for several weeks on her right foot. History of Present Illness     Patient is a 81-year-old female presenting with an ingrown right great toenail x 2 weeks. She states she has had this in the past and it went away on its own. Patient denies any symptoms of fever, chills, heat. She has not tried anything to alleviate the pain from the ingrown nail. Review of Systems   Review of Systems   Constitutional:  Negative for chills and fever. HENT:  Negative for ear pain and sore throat. Eyes:  Negative for pain and visual disturbance. Respiratory:  Negative for cough and shortness of breath. Cardiovascular:  Negative for chest pain and palpitations. Gastrointestinal:  Negative for abdominal pain and vomiting. Genitourinary:  Negative for dysuria and hematuria. Musculoskeletal:  Negative for arthralgias and back pain. Skin:  Positive for color change and wound. Negative for rash. Neurological:  Negative for seizures and syncope. All other systems reviewed and are negative.       Current Medications       Current Outpatient Medications:     neomycin-bacitracin-polymyxin (NEOSPORIN) 5-400-5,000 ointment, Apply topically 3 (three) times a day, Disp: 3.5 g, Rfl: 0    calcium carbonate (TUMS) 500 mg chewable tablet, Chew 1 tablet daily, Disp: , Rfl:     etonogestrel (NEXPLANON) subdermal implant, 68 mg by Subdermal route once for 1 dose, Disp: 1 each, Rfl: 0    famotidine (PEPCID) 20 mg tablet, Take 1 tablet (20 mg total) by mouth 2 (two) times a day, Disp: 60 tablet, Rfl: 11    Karen 0.25-35 MG-MCG per tablet, TAKE 1 TABLET BY MOUTH EVERY DAY, Disp: 28 tablet, Rfl: 6    Multiple Vitamin (multivitamin) tablet, Take 1 tablet by mouth daily (Patient not taking: Reported on 5/21/2023), Disp: , Rfl:     saccharomyces boulardii (FLORASTOR) 250 mg capsule, Take 250 mg by mouth 2 (two) times a day, Disp: , Rfl:     Current Allergies     Allergies as of 11/07/2023 - Reviewed 11/07/2023   Allergen Reaction Noted    Augmentin [amoxicillin-pot clavulanate] Hives 07/14/2022            The following portions of the patient's history were reviewed and updated as appropriate: allergies, current medications, past family history, past medical history, past social history, past surgical history and problem list.     Past Medical History:   Diagnosis Date    No known health problems        Past Surgical History:   Procedure Laterality Date    WISDOM TOOTH EXTRACTION  2019       Family History   Problem Relation Age of Onset    Hypertension Maternal Grandmother     Hyperlipidemia Maternal Grandmother     Hypertension Paternal Grandmother     Hyperlipidemia Paternal Grandmother     Breast cancer Neg Hx     Cancer Neg Hx          Medications have been verified. Objective   /55   Pulse 89   Temp 98.5 °F (36.9 °C)   Resp 16   SpO2 99%   No LMP recorded. Patient has had an implant. Physical Exam     Physical Exam  Vitals and nursing note reviewed. Constitutional:       Appearance: Normal appearance. She is normal weight. HENT:      Head: Normocephalic and atraumatic.       Right Ear: External ear normal.      Left Ear: External ear normal.      Nose: Nose normal.   Eyes:      Conjunctiva/sclera: Conjunctivae normal.   Cardiovascular:      Rate and Rhythm: Normal rate. Pulses: Normal pulses. Pulmonary:      Effort: Pulmonary effort is normal.      Breath sounds: Normal breath sounds. Musculoskeletal:        Feet:    Feet:      Right foot:      Toenail Condition: Right toenails are ingrown. Left foot:      Toenail Condition: Left toenails are normal.      Comments: Erythematous ingrown toenail of right great toe  Skin:     General: Skin is warm and dry. Neurological:      General: No focal deficit present. Mental Status: She is alert. Mental status is at baseline.    Psychiatric:         Mood and Affect: Mood normal.         Behavior: Behavior normal.

## 2023-11-24 ENCOUNTER — HOSPITAL ENCOUNTER (EMERGENCY)
Facility: HOSPITAL | Age: 23
Discharge: HOME | End: 2023-11-24
Attending: EMERGENCY MEDICINE
Payer: COMMERCIAL

## 2023-11-24 VITALS
HEART RATE: 78 BPM | DIASTOLIC BLOOD PRESSURE: 67 MMHG | RESPIRATION RATE: 18 BRPM | SYSTOLIC BLOOD PRESSURE: 142 MMHG | TEMPERATURE: 97.3 F | OXYGEN SATURATION: 99 %

## 2023-11-24 DIAGNOSIS — Z13.9 ENCOUNTER FOR MEDICAL SCREENING EXAMINATION: Primary | ICD-10-CM

## 2023-11-24 PROCEDURE — 99282 EMERGENCY DEPT VISIT SF MDM: CPT | Performed by: EMERGENCY MEDICINE

## 2023-11-24 PROCEDURE — 99282 EMERGENCY DEPT VISIT SF MDM: CPT

## 2023-11-24 NOTE — ED PROVIDER NOTES
16-year-old male with possible ingestion of foreign body. Knife had some marks on it which concerned the mother that possibly ate some metal pieces yesterday evening with Thanksgiving dinner    No abdominal pain no other issues. .ROS: per resident physician note    Gen: NAD, AA&Ox3  HEENT: PERRL, EOMI  Neck: supple  CV: RRR  Lungs: CTA B/L  Abdomen: soft, NT/ND  Ext: no swelling or deformity  Neuro: 5/5 strength all extremities, sensation grossly intact  Skin: no rash      Plan discharge no further need for imaging     Sherman Beckford DO  11/24/23 1238

## 2023-12-12 ENCOUNTER — OFFICE VISIT (OUTPATIENT)
Dept: PODIATRY | Facility: CLINIC | Age: 23
End: 2023-12-12
Payer: COMMERCIAL

## 2023-12-12 VITALS
DIASTOLIC BLOOD PRESSURE: 78 MMHG | HEART RATE: 91 BPM | WEIGHT: 130 LBS | HEIGHT: 65 IN | SYSTOLIC BLOOD PRESSURE: 119 MMHG | BODY MASS INDEX: 21.66 KG/M2

## 2023-12-12 DIAGNOSIS — L60.0 INGROWN RIGHT GREATER TOENAIL: ICD-10-CM

## 2023-12-12 DIAGNOSIS — M79.674 PAIN IN TOE OF RIGHT FOOT: Primary | ICD-10-CM

## 2023-12-12 PROCEDURE — 99242 OFF/OP CONSLTJ NEW/EST SF 20: CPT | Performed by: PODIATRIST

## 2023-12-12 PROCEDURE — 11730 AVULSION NAIL PLATE SIMPLE 1: CPT | Performed by: PODIATRIST

## 2023-12-12 RX ORDER — LIDOCAINE HYDROCHLORIDE AND EPINEPHRINE 10; 10 MG/ML; UG/ML
1 INJECTION, SOLUTION INFILTRATION; PERINEURAL ONCE
Status: COMPLETED | OUTPATIENT
Start: 2023-12-12 | End: 2023-12-12

## 2023-12-12 RX ORDER — LIDOCAINE HYDROCHLORIDE 10 MG/ML
1 INJECTION, SOLUTION EPIDURAL; INFILTRATION; INTRACAUDAL; PERINEURAL ONCE
Status: COMPLETED | OUTPATIENT
Start: 2023-12-12 | End: 2023-12-12

## 2023-12-12 RX ADMIN — LIDOCAINE HYDROCHLORIDE 1 ML: 10 INJECTION, SOLUTION EPIDURAL; INFILTRATION; INTRACAUDAL; PERINEURAL at 16:46

## 2023-12-12 RX ADMIN — LIDOCAINE HYDROCHLORIDE AND EPINEPHRINE 1 ML: 10; 10 INJECTION, SOLUTION INFILTRATION; PERINEURAL at 16:48

## 2023-12-12 NOTE — PROGRESS NOTES
Assessment/Plan:    Explained to patient that she is dealing with a mildly infected ingrown nail of the lateral border of the right great toe. Discussed treatment options comparing partial avulsion with partial matrixectomy with patient currently preferring the former. Procedure as follows: Anesthesia via 2 cc of a 1: 1 mixture of 1% Xylocaine with epinephrine and 1% Xylocaine plain. Betadine prep of toe. The lateral nail border was avulsed to the eponychium. Bacitracin dressing applied. Patient to soak twice daily in warm water followed by bacitracin dressing. Reappoint as needed    No problem-specific Assessment & Plan notes found for this encounter. Diagnoses and all orders for this visit:    Pain in toe of right foot    Ingrown right greater toenail  -     Ambulatory Referral to Podiatry  -     lidocaine (PF) (XYLOCAINE-MPF) 1 % injection 1 mL  -     lidocaine-epinephrine (XYLOCAINE/EPINEPHRINE) 1 %-1:100,000 injection 1 mL          Subjective:      Patient ID: Jose Eduardo Sanchez is a 26 y.o. female. HPI    Patient, a 32-year-old female in apparent good health presents with a painful ingrown nail along the lateral nail border of the right great toe. This nail was recently infected but the infection for the most part has resolved. The following portions of the patient's history were reviewed and updated as appropriate: allergies, current medications, past family history, past medical history, past social history, past surgical history, and problem list.    Review of Systems   Cardiovascular: Negative. Musculoskeletal: Negative. Psychiatric/Behavioral: Negative. Objective:      /78   Pulse 91   Ht 5' 5" (1.651 m)   Wt 59 kg (130 lb)   BMI 21.63 kg/m²          Physical Exam  Constitutional:       Appearance: Normal appearance. Cardiovascular:      Pulses: Normal pulses. Musculoskeletal:         General: Normal range of motion.    Skin:     Comments: Pain with palpation distal lateral nail border right hallux. Slight bleeding noted at the distal lateral aspect of the nail border. Nail removal    Date/Time: 12/12/2023 4:15 PM    Performed by: Alee Butler DPM  Authorized by: Alee Butler DPM    Patient location:  ClinicUniversal Protocol:  Consent: Verbal consent obtained. Risks and benefits: risks, benefits and alternatives were discussed  Consent given by: patient  Patient understanding: patient states understanding of the procedure being performed  Patient identity confirmed: verbally with patient    Location:     Foot:  R big toe  Pre-procedure details:     Skin preparation:  Betadine    Preparation: Patient was prepped and draped in the usual sterile fashion    Anesthesia (see MAR for exact dosages): Anesthesia method:  Nerve block    Block needle gauge:  25 G    Block anesthetic:  Lidocaine 1% WITH epi and lidocaine 1% w/o epi    Block injection procedure:  Anatomic landmarks identified    Block outcome:  Anesthesia achieved  Nail Removal:     Nail removed:  Partial    Nail side:  Lateral    Nail bed sutured: no    Ingrown nail:     Wedge excision of skin: no      Nail matrix removed or ablated:  None  Post-procedure details:     Dressing:  4x4 sterile gauze, antibiotic ointment and gauze roll    Patient tolerance of procedure:   Tolerated well, no immediate complications

## 2024-02-07 ENCOUNTER — TELEPHONE (OUTPATIENT)
Dept: FAMILY MEDICINE CLINIC | Facility: CLINIC | Age: 24
End: 2024-02-07

## 2024-02-07 NOTE — TELEPHONE ENCOUNTER
Pt left a vm requesting a call to reschedule a missed appt.       Spoke with pt and the appt was for GYN. Call transferred to GYN.

## 2024-02-14 DIAGNOSIS — N93.9 ABNORMAL UTERINE BLEEDING (AUB): ICD-10-CM

## 2024-02-14 RX ORDER — NORGESTIMATE AND ETHINYL ESTRADIOL 0.25-0.035
KIT ORAL
Qty: 28 TABLET | Refills: 1 | Status: SHIPPED | OUTPATIENT
Start: 2024-02-14

## 2024-04-07 DIAGNOSIS — N93.9 ABNORMAL UTERINE BLEEDING (AUB): ICD-10-CM

## 2024-04-08 RX ORDER — NORGESTIMATE AND ETHINYL ESTRADIOL 0.25-0.035
KIT ORAL
Qty: 28 TABLET | Refills: 0 | Status: SHIPPED | OUTPATIENT
Start: 2024-04-08

## 2024-05-06 DIAGNOSIS — N93.9 ABNORMAL UTERINE BLEEDING (AUB): ICD-10-CM

## 2024-05-06 RX ORDER — NORGESTIMATE AND ETHINYL ESTRADIOL 0.25-0.035
KIT ORAL
Qty: 28 TABLET | Refills: 0 | OUTPATIENT
Start: 2024-05-06

## 2024-05-09 ENCOUNTER — TELEPHONE (OUTPATIENT)
Dept: OBGYN CLINIC | Facility: CLINIC | Age: 24
End: 2024-05-09

## 2024-05-09 DIAGNOSIS — N93.9 ABNORMAL UTERINE BLEEDING (AUB): ICD-10-CM

## 2024-05-10 PROBLEM — Z02.1 PRE-EMPLOYMENT EXAMINATION: Status: RESOLVED | Noted: 2023-08-24 | Resolved: 2024-05-10

## 2024-05-10 PROBLEM — Z00.00 ANNUAL PHYSICAL EXAM: Status: RESOLVED | Noted: 2022-12-21 | Resolved: 2024-05-10

## 2024-05-13 RX ORDER — NORGESTIMATE AND ETHINYL ESTRADIOL 0.25-0.035
1 KIT ORAL DAILY
Qty: 28 TABLET | Refills: 0 | Status: SHIPPED | OUTPATIENT
Start: 2024-05-13

## 2024-05-13 NOTE — TELEPHONE ENCOUNTER
I am giving her this one last refill since she made the appointment for 6/11/24.  But if she does not come in on that date, I will not extend her again.

## 2024-05-13 NOTE — TELEPHONE ENCOUNTER
Patient called and was scheduled for yearly. In the meantime patient is requesting refills for her birth control

## 2024-06-11 ENCOUNTER — ANNUAL EXAM (OUTPATIENT)
Dept: OBGYN CLINIC | Facility: CLINIC | Age: 24
End: 2024-06-11

## 2024-06-11 VITALS
HEART RATE: 85 BPM | BODY MASS INDEX: 22.73 KG/M2 | SYSTOLIC BLOOD PRESSURE: 109 MMHG | WEIGHT: 136.6 LBS | DIASTOLIC BLOOD PRESSURE: 73 MMHG

## 2024-06-11 DIAGNOSIS — Z01.419 ENCOUNTER FOR GYNECOLOGICAL EXAMINATION WITHOUT ABNORMAL FINDING: Primary | ICD-10-CM

## 2024-06-11 DIAGNOSIS — N93.9 ABNORMAL UTERINE BLEEDING (AUB): ICD-10-CM

## 2024-06-11 DIAGNOSIS — Z12.4 SCREENING FOR CERVICAL CANCER: ICD-10-CM

## 2024-06-11 DIAGNOSIS — Z12.39 ENCOUNTER FOR BREAST CANCER SCREENING USING NON-MAMMOGRAM MODALITY: ICD-10-CM

## 2024-06-11 DIAGNOSIS — Z11.3 SCREEN FOR STD (SEXUALLY TRANSMITTED DISEASE): ICD-10-CM

## 2024-06-11 PROCEDURE — 99395 PREV VISIT EST AGE 18-39: CPT | Performed by: NURSE PRACTITIONER

## 2024-06-11 PROCEDURE — G0124 SCREEN C/V THIN LAYER BY MD: HCPCS | Performed by: PATHOLOGY

## 2024-06-11 PROCEDURE — 87491 CHLMYD TRACH DNA AMP PROBE: CPT | Performed by: NURSE PRACTITIONER

## 2024-06-11 PROCEDURE — 87591 N.GONORRHOEAE DNA AMP PROB: CPT | Performed by: NURSE PRACTITIONER

## 2024-06-11 PROCEDURE — G0145 SCR C/V CYTO,THINLAYER,RESCR: HCPCS | Performed by: PATHOLOGY

## 2024-06-11 RX ORDER — NORGESTIMATE AND ETHINYL ESTRADIOL 0.25-0.035
1 KIT ORAL DAILY
Qty: 28 TABLET | Refills: 12 | Status: SHIPPED | OUTPATIENT
Start: 2024-06-11

## 2024-06-11 NOTE — LETTER
2024    To Mary Kay Bailey  : 2000      This letter is to advise you that your recent CULTURES for gonorrhea and chlamydia were reviewed by me and are NORMAL.  Please contact the office for an appointment if you have any additional concerns.    CLARI Mosher

## 2024-06-11 NOTE — PROGRESS NOTES
ANNUAL GYNECOLOGICAL EXAMINATION    Mary Kay Bailey is a 23 y.o. female who presents today for annual GYN exam.  Her last pap smear was performed 2022 and result was LSIL.  She had HIV screening performed 3/13/2023 and it was negative.  She reports menses as regular.  Patient's last menstrual period was 2024 (approximate).  Her general medical history has been reviewed and she reports it as follows:    Past Medical History:   Diagnosis Date    Abnormal Pap smear of cervix     2022 LSIL pap     Past Surgical History:   Procedure Laterality Date    WISDOM TOOTH EXTRACTION       OB History          0    Para   0    Term   0       0    AB   0    Living   0         SAB   0    IAB   0    Ectopic   0    Multiple   0    Live Births   0               Social History     Tobacco Use    Smoking status: Never    Smokeless tobacco: Never   Vaping Use    Vaping status: Never Used   Substance Use Topics    Alcohol use: Yes     Comment: couple times/month    Drug use: Not Currently     Types: Marijuana     Comment: last used marijuana 3/2021     Social History     Substance and Sexual Activity   Sexual Activity Yes    Partners: Male    Birth control/protection: OCP, Implant     Cancer-related family history is negative for Breast cancer, Cancer, Colon cancer, and Ovarian cancer.    Current Outpatient Medications   Medication Instructions    etonogestrel (NEXPLANON) 68 mg, Subdermal, Once    Multiple Vitamin (multivitamin) tablet 1 tablet, Oral, Daily    norgestimate-ethinyl estradiol (Karen) 0.25-35 MG-MCG per tablet 1 tablet, Oral, Daily       Review of Systems:  Review of Systems   Constitutional: Negative.    Gastrointestinal: Negative.    Genitourinary:  Negative for difficulty urinating, menstrual problem, pelvic pain and vaginal discharge.   Skin: Negative.        Physical Exam:  /73 (BP Location: Right arm, Patient Position: Sitting, Cuff Size: Standard)   Pulse 85   Wt 62 kg (136 lb 9.6  oz)   LMP 2024 (Approximate)   BMI 22.73 kg/m²   Physical Exam  Constitutional:       General: She is not in acute distress.     Appearance: She is well-developed.   Genitourinary:      Vulva normal.      No lesions in the vagina.        Right Adnexa: not tender and no mass present.     Left Adnexa: not tender and no mass present.     Cervical friability present.      No cervical motion tenderness or lesion.      Uterus is not tender.   Breasts:     Right: Inverted nipple present. No mass, nipple discharge, skin change or tenderness.      Left: Inverted nipple present. No mass, nipple discharge, skin change or tenderness.   Neck:      Thyroid: No thyromegaly.   Cardiovascular:      Rate and Rhythm: Normal rate and regular rhythm.   Pulmonary:      Effort: Pulmonary effort is normal.   Abdominal:      Palpations: Abdomen is soft.      Tenderness: There is no abdominal tenderness.   Musculoskeletal:      Cervical back: Neck supple.   Neurological:      Mental Status: She is alert and oriented to person, place, and time.   Skin:     General: Skin is warm and dry.   Vitals reviewed.       Assessment/Plan:   1. Normal well-woman GYN exam.  2. Cervical cancer screening:  Normal cervical exam.  Pap smear done with HPV reflex.  Has received full HPV vaccine series in the past.   3. STD screening:  Orders placed for vaginal GC/CT cultures.  Declines screening for HIV, HCV, HBV, and syphilis.   4. Breast cancer screening:  Normal breast exam.  Reviewed breast self-awareness.   5. Depression Screening: Patient's depression screening was assessed with a PHQ-2 score of 0. Their PHQ-9 score was 3. Clinically patient does not have depression. No treatment is required.   6. BMI Counseling: Body mass index is 22.73 kg/m².  No intervention indicated.   7. Contraception:  Nexplanon  last month and she desires removal.  Return next week for removal procedure.  She desires to continue with OCP's.  Given Rx refills for  another year.   8. Return to office in 1 year for annual GYN exam.    Reviewed with patient that test results are available in [employer] immediately, but that they will not necessarily be reviewed by me immediately.  Explained that I will review results at my earliest opportunity and contact patient appropriately.

## 2024-06-13 LAB
C TRACH DNA SPEC QL NAA+PROBE: NEGATIVE
N GONORRHOEA DNA SPEC QL NAA+PROBE: NEGATIVE

## 2024-06-18 ENCOUNTER — TELEPHONE (OUTPATIENT)
Dept: OBGYN CLINIC | Facility: CLINIC | Age: 24
End: 2024-06-18

## 2024-06-18 LAB
LAB AP GYN PRIMARY INTERPRETATION: ABNORMAL
Lab: ABNORMAL
PATH INTERP SPEC-IMP: ABNORMAL

## 2024-06-18 NOTE — TELEPHONE ENCOUNTER
I contacted patient and reviewed LSIL pap results with her.  Recommend repeat pap in 1 year.  All questions were answered and she verbalizes understanding.

## 2024-06-27 ENCOUNTER — PROCEDURE VISIT (OUTPATIENT)
Dept: OBGYN CLINIC | Facility: CLINIC | Age: 24
End: 2024-06-27

## 2024-06-27 VITALS
WEIGHT: 135.8 LBS | HEART RATE: 61 BPM | BODY MASS INDEX: 22.6 KG/M2 | SYSTOLIC BLOOD PRESSURE: 113 MMHG | DIASTOLIC BLOOD PRESSURE: 76 MMHG

## 2024-06-27 DIAGNOSIS — Z30.46 ENCOUNTER FOR NEXPLANON REMOVAL: Primary | ICD-10-CM

## 2024-06-27 PROCEDURE — 11982 REMOVE DRUG IMPLANT DEVICE: CPT | Performed by: NURSE PRACTITIONER

## 2024-06-27 RX ORDER — LIDOCAINE HYDROCHLORIDE AND EPINEPHRINE BITARTRATE 20; .01 MG/ML; MG/ML
3 INJECTION, SOLUTION SUBCUTANEOUS ONCE
Status: COMPLETED | OUTPATIENT
Start: 2024-06-27 | End: 2024-06-27

## 2024-06-27 RX ADMIN — LIDOCAINE HYDROCHLORIDE AND EPINEPHRINE BITARTRATE 3 ML: 20; .01 INJECTION, SOLUTION SUBCUTANEOUS at 12:10

## 2024-06-27 NOTE — PATIENT INSTRUCTIONS
Thank you for your confidence in our team.   We appreciate you and welcome your feedback.   If you receive a survey from us, please take a few moments to let us know how we are doing.   Sincerely,  CLARI Mosher

## 2024-06-27 NOTE — PROGRESS NOTES
"Universal Protocol:  Consent: Verbal consent obtained. Written consent obtained.  Risks and benefits: risks, benefits and alternatives were discussed  Consent given by: patient  Time out: Immediately prior to procedure a \"time out\" was called to verify the correct patient, procedure, equipment, support staff and site/side marked as required.  Patient understanding: patient states understanding of the procedure being performed  Patient consent: the patient's understanding of the procedure matches consent given  Procedure consent: procedure consent matches procedure scheduled  Required items: required blood products, implants, devices, and special equipment available  Patient identity confirmed: verbally with patient  Remove and insert drug implant    Date/Time: 6/27/2024 11:00 AM    Performed by: CLARI Mosher  Authorized by: CLARI Mosher    Indication:     Indication: Presence of non-biodegradable drug delivery implant    Pre-procedure:     Pre-procedure timeout performed: yes      Prepped with: alcohol 70% and povidone-iodine      Local anesthetic:  Lidocaine with epinephrine    The site was cleaned and prepped in a sterile fashion: yes    Procedure:     Procedure:  Removal    Small stab incision was made in arm: yes      Left/right:  Left    Site was closed with steri-strips and pressure bandage applied: yes        "

## 2024-10-24 ENCOUNTER — OFFICE VISIT (OUTPATIENT)
Dept: GASTROENTEROLOGY | Facility: CLINIC | Age: 24
End: 2024-10-24

## 2024-10-24 VITALS
DIASTOLIC BLOOD PRESSURE: 82 MMHG | HEIGHT: 65 IN | TEMPERATURE: 97.5 F | WEIGHT: 132.6 LBS | SYSTOLIC BLOOD PRESSURE: 112 MMHG | BODY MASS INDEX: 22.09 KG/M2

## 2024-10-24 DIAGNOSIS — R19.8 ALTERNATING CONSTIPATION AND DIARRHEA: ICD-10-CM

## 2024-10-24 DIAGNOSIS — K21.9 GASTROESOPHAGEAL REFLUX DISEASE WITHOUT ESOPHAGITIS: Primary | ICD-10-CM

## 2024-10-24 PROCEDURE — 99244 OFF/OP CNSLTJ NEW/EST MOD 40: CPT | Performed by: INTERNAL MEDICINE

## 2024-10-24 NOTE — PROGRESS NOTES
[facility] - Outpatient Consultation  Mary Kay Bailey 24 y.o. female MRN: [id number]  Encounter: [id number]          ASSESSMENT AND PLAN:      1. Gastroesophageal reflux disease without esophagitis  She most likely has gastroesophageal reflux disease but she could have functional dyspepsia, Helicobacter pylori infection, peptic ulcer disease, gallstones, or pancreatic disease.  I will begin by giving her a trial of high-dose omeprazole 40 mg twice a day.  I also gave her a handout the details diet and lifestyle modifications that should help her symptoms.  If her symptoms persist I will consider Helicobacter pylori stool antigen or upper endoscopy.  She does not currently have insurance but she is applying for insurance and hopefully will have this in time for her further evaluation.    2. Alternating constipation and diarrhea  She most likely has functional alternating diarrhea and constipation.  We will continue to monitor the symptoms and determine if further evaluation is necessary.    ______________________________________________________________________    HPI: She presents for evaluation because of approximately 3 years of intermittent reflux symptoms.  She was initially evaluated by Dr. Amato 3 years ago and was diagnosed with probable gastroesophageal reflux disease.  She was started on omeprazole but did not feel this helped.  She was scheduled for an upper endoscopy but was not able to follow-up for this.  Her symptoms have been intermittent since then she presents for evaluation because the symptoms have persisted.  She denies dysphagia, nausea, vomiting, and abdominal pain but has alternating constipation and diarrhea.  She also denies bleeding and weight loss.    She has never previously had upper endoscopy or colonoscopy and denies any family history of colon cancer or colon polyps.      REVIEW OF SYSTEMS:    CONSTITUTIONAL: Denies any fever, chills, rigors, and weight  loss.  HEENT: No earache or tinnitus. Denies hearing loss or visual disturbances.  CARDIOVASCULAR: No chest pain or palpitations.   RESPIRATORY: Denies any cough, hemoptysis, shortness of breath or dyspnea on exertion.  GASTROINTESTINAL: As noted in the History of Present Illness.   GENITOURINARY: No problems with urination. Denies any hematuria or dysuria.  NEUROLOGIC: No dizziness or vertigo, denies headaches.   MUSCULOSKELETAL: Denies any muscle or joint pain.   SKIN: Denies skin rashes or itching.   ENDOCRINE: Denies excessive thirst. Denies intolerance to heat or cold.  PSYCHOSOCIAL: Denies depression or anxiety. Denies any recent memory loss.       Historical Information   Past Medical History:   Diagnosis Date    Abnormal Pap smear of cervix     12/2022 LSIL pap; 6/2024 LSIL pap     Past Surgical History:   Procedure Laterality Date    WISDOM TOOTH EXTRACTION  2019     Social History   Social History     Substance and Sexual Activity   Alcohol Use Yes    Comment: couple times/month     Social History     Substance and Sexual Activity   Drug Use Not Currently    Types: Marijuana    Comment: last used marijuana 3/2021     Social History     Tobacco Use   Smoking Status Never   Smokeless Tobacco Never     Family History   Problem Relation Age of Onset    Hypertension Maternal Grandmother     Hyperlipidemia Maternal Grandmother     Hypertension Paternal Grandmother     Hyperlipidemia Paternal Grandmother     Breast cancer Neg Hx     Cancer Neg Hx     Colon cancer Neg Hx     Ovarian cancer Neg Hx        Meds/Allergies       Current Outpatient Medications:     Multiple Vitamin (multivitamin) tablet    norgestimate-ethinyl estradiol (Karen) 0.25-35 MG-MCG per tablet    etonogestrel (NEXPLANON) subdermal implant    Allergies   Allergen Reactions    Nuts - Food Allergy Anaphylaxis     Pine nuts      Augmentin [Amoxicillin-Pot Clavulanate] Hives     Reports hives after 2nd dose of Augmentin.  Patient has had in the past  "without reaction.           Objective     Blood pressure 112/82, temperature 97.5 °F (36.4 °C), temperature source Tympanic, height 5' 5\" (1.651 m), weight 60.1 kg (132 lb 9.6 oz), not currently breastfeeding. Body mass index is 22.07 kg/m².        PHYSICAL EXAM:      General Appearance:   Alert, cooperative, no distress   HEENT:   Normocephalic, atraumatic, anicteric.     Neck:  Supple, symmetrical, trachea midline   Lungs:   Clear to auscultation bilaterally; no rales, rhonchi or wheezing; respirations unlabored    Heart::   Regular rate and rhythm; no murmur, rub, or gallop.   Abdomen:   Soft, non-tender, non-distended; normal bowel sounds; no masses, no organomegaly    Genitalia:   Deferred    Rectal:   Deferred    Extremities:  No cyanosis, clubbing or edema    Pulses:  2+ and symmetric    Skin:  No jaundice, rashes, or lesions    Lymph nodes:  No palpable cervical lymphadenopathy        Lab Results:   No visits with results within 1 Day(s) from this visit.   Latest known visit with results is:   Annual Exam on 06/11/2024   Component Date Value    Case Report 06/11/2024                      Value:Gynecologic Cytology Report                       Case: [id number]                                  Authorizing Provider:  CLARI Mosher   Collected:           06/11/2024 1527              Ordering Location:     [facility]      Received:            06/11/2024 1527                                     [facility]                                                          First Screen:          Pawan Escudero                                                                 Pathologist:           Ramiro aSldivar DO                                                            Specimen:    LIQUID-BASED PAP, SCREENING, Cervix, Endocervical                                          Primary Interpretation 06/11/2024 Epithelial cell abnormality     Interpretation 06/11/2024 Low grade squamous intraepithelial " lesion     Specimen Adequacy 06/11/2024 Satisfactory for evaluation. Absence of endocervical/transformation zone component.     Note 06/11/2024                      Value:Scant cellularity.      Additional Information 06/11/2024                      Value:[employer]'s FDA approved ,  and ThinPrep Imaging Duo System are utilized with strict adherence to the 's instruction manual to prepare gynecologic and non-gynecologic cytology specimens for the production of ThinPrep slides as well as for gynecologic ThinPrep imaging. These processes have been validated by our laboratory and/or by the .  The Pap test is not a diagnostic procedure and should not be used as the sole means to detect cervical cancer. It is only a screening procedure to aid in the detection of cervical cancer and its precursors. Both false-negative and false-positive results have been experienced. Your patient's test result should be interpreted in this context together with the history and clinical findings.  Interpretation performed at [place] [address]      Gross Description 06/11/2024                      Value:20 ml , pale red, cloudy received in a ThinPrep vial.      N gonorrhoeae, DNA Probe 06/11/2024 Negative     Chlamydia trachomatis, D* 06/11/2024 Negative          Radiology Results:   No results found.

## 2024-11-12 ENCOUNTER — TELEPHONE (OUTPATIENT)
Age: 24
End: 2024-11-12

## 2024-11-12 DIAGNOSIS — K21.9 GASTROESOPHAGEAL REFLUX DISEASE WITHOUT ESOPHAGITIS: Primary | ICD-10-CM

## 2024-11-12 RX ORDER — OMEPRAZOLE 40 MG/1
40 CAPSULE, DELAYED RELEASE ORAL 2 TIMES DAILY
Qty: 60 CAPSULE | Refills: 3 | Status: SHIPPED | OUTPATIENT
Start: 2024-11-12

## 2024-11-12 NOTE — TELEPHONE ENCOUNTER
Pt called has questions regarding omeprazole. Transferred over to triage nurse for further assistance

## 2024-11-12 NOTE — TELEPHONE ENCOUNTER
LOV 10/24/24    Pt reports script for omeprazole 40 mg BID has not been received by [facility]. Pt currently does not have insurance however she will check script prices using [employer]. Please send script to:    [id number]

## 2025-01-22 ENCOUNTER — OFFICE VISIT (OUTPATIENT)
Dept: URGENT CARE | Facility: CLINIC | Age: 25
End: 2025-01-22

## 2025-01-22 VITALS
WEIGHT: 136.2 LBS | BODY MASS INDEX: 22.69 KG/M2 | DIASTOLIC BLOOD PRESSURE: 82 MMHG | HEART RATE: 92 BPM | OXYGEN SATURATION: 99 % | TEMPERATURE: 97.7 F | SYSTOLIC BLOOD PRESSURE: 112 MMHG | RESPIRATION RATE: 18 BRPM | HEIGHT: 65 IN

## 2025-01-22 DIAGNOSIS — M25.511 ACUTE PAIN OF RIGHT SHOULDER: Primary | ICD-10-CM

## 2025-01-22 PROCEDURE — G0382 LEV 3 HOSP TYPE B ED VISIT: HCPCS

## 2025-01-22 RX ORDER — METHYLPREDNISOLONE 4 MG/1
TABLET ORAL
Qty: 21 TABLET | Refills: 0 | Status: SHIPPED | OUTPATIENT
Start: 2025-01-22

## 2025-01-22 NOTE — PROGRESS NOTES
"  [facility]        NAME: Mary Kay Bailey is a 24 y.o. female  : 2000    MRN: [id number]  DATE: 2025  TIME: 9:26 AM    Assessment and Plan   Acute pain of right shoulder [M25.511]  1. Acute pain of right shoulder  methylPREDNISolone 4 MG tablet therapy pack            Patient Instructions     Take steroids as prescribed.    While taking this medication you may also take Tylenol for pain relief.    Avoid NSAIDS (Motrin, ibuprofen, Aleve, Advil) until steroids are completed.    You can alternate ice and heat applications.    Voltaren gel, Icy Hot cream, Lidoderm Patches.    Follow-up with PCP or ortho for continued discomfort.    Go to the ED for any severely worsening symptoms.     If tests are performed, our office will contact you with results only if changes need to made to the care plan discussed with you at the visit. You can review your full results on [facility].      Chief Complaint     Chief Complaint   Patient presents with    Shoulder Pain     Right shoulder, started last Thursday, denies any specific injury, reports dull pain, swelling, and denies fever         History of Present Illness       24-year-old RHD female presenting with right shoulder pain x 6 days.  No known injury or incident prior to symptom onset.  Works part time as a .  Reports aching pain in anterior shoulder, occasionally \"burning.\"  Arm is starting to feel weak.  No numbness, tingling, clicking, popping, or locking.  Denies prior history of injuries to the right shoulder.  No OTC treatments tried for pain relief.        Review of Systems   Review of Systems   Constitutional:  Negative for fever.   Respiratory:  Negative for shortness of breath.    Cardiovascular:  Negative for chest pain.   Gastrointestinal:  Negative for abdominal pain.   Musculoskeletal:         Right shoulder pain   Skin:  Negative for rash.   Neurological:  Negative for headaches.         Current Medications " "      Current Outpatient Medications:     methylPREDNISolone 4 MG tablet therapy pack, Use as directed on package, Disp: 21 tablet, Rfl: 0    etonogestrel (NEXPLANON) subdermal implant, 68 mg by Subdermal route once for 1 dose, Disp: 1 each, Rfl: 0    Multiple Vitamin (multivitamin) tablet, Take 1 tablet by mouth daily, Disp: , Rfl:     norgestimate-ethinyl estradiol (Karen) 0.25-35 MG-MCG per tablet, Take 1 tablet by mouth daily, Disp: 28 tablet, Rfl: 12    omeprazole (PriLOSEC) 40 MG capsule, Take 1 capsule (40 mg total) by mouth 2 (two) times a day, Disp: 60 capsule, Rfl: 3    Current Allergies     Allergies as of 01/22/2025 - Reviewed 01/22/2025   Allergen Reaction Noted    Nuts - food allergy Anaphylaxis 11/24/2023    Augmentin [amoxicillin-pot clavulanate] Hives 07/14/2022            The following portions of the patient's history were reviewed and updated as appropriate: allergies, current medications, past family history, past medical history, past social history, past surgical history and problem list.     Past Medical History:   Diagnosis Date    Abnormal Pap smear of cervix     12/2022 [facility]IL pap; 6/2024 [facility]IL pap       Past Surgical History:   Procedure Laterality Date    WISDOM TOOTH EXTRACTION  2019       Family History   Problem Relation Age of Onset    Hypertension Maternal Grandmother     Hyperlipidemia Maternal Grandmother     Hypertension Paternal Grandmother     Hyperlipidemia Paternal Grandmother     Breast cancer Neg Hx     Cancer Neg Hx     Colon cancer Neg Hx     Ovarian cancer Neg Hx          Medications have been verified.        Objective   /82 (BP Location: Left arm, Patient Position: Sitting)   Pulse 92   Temp 97.7 °F (36.5 °C) (Tympanic)   Resp 18   Ht 5' 5\" (1.651 m)   Wt 61.8 kg (136 lb 3.2 oz)   SpO2 99%   BMI 22.66 kg/m²        Physical Exam     Physical Exam  Vitals and nursing note reviewed.   Constitutional:       General: She is not in acute distress.  HENT:      Head: " Normocephalic and atraumatic.      Mouth/Throat:      Mouth: Mucous membranes are moist.   Cardiovascular:      Rate and Rhythm: Normal rate and regular rhythm.   Pulmonary:      Effort: Pulmonary effort is normal.      Breath sounds: Normal breath sounds.   Musculoskeletal:      Right shoulder: Tenderness (anteriorly/AC joint) present. No effusion. Decreased range of motion (FF 45 deg / abduction 90 deg secondary to pain). Normal strength.      Cervical back: Normal range of motion and neck supple. No tenderness. No pain with movement.   Skin:     General: Skin is warm and dry.      Capillary Refill: Capillary refill takes less than 2 seconds.   Neurological:      Mental Status: She is alert and oriented to person, place, and time.

## 2025-01-22 NOTE — PATIENT INSTRUCTIONS
Take steroids as prescribed.    While taking this medication you may also take Tylenol for pain relief.    Avoid NSAIDS (Motrin, ibuprofen, Aleve, Advil) until steroids are completed.    You can alternate ice and heat applications.    Voltaren gel, Icy Hot cream, Lidoderm Patches.    Follow-up with PCP or ortho for continued discomfort.    Go to the ED for any severely worsening symptoms.

## 2025-05-02 ENCOUNTER — HOSPITAL ENCOUNTER (EMERGENCY)
Facility: HOSPITAL | Age: 25
Discharge: HOME | End: 2025-05-02
Attending: EMERGENCY MEDICINE
Payer: COMMERCIAL

## 2025-05-02 VITALS
SYSTOLIC BLOOD PRESSURE: 128 MMHG | RESPIRATION RATE: 20 BRPM | TEMPERATURE: 98 F | HEART RATE: 76 BPM | DIASTOLIC BLOOD PRESSURE: 73 MMHG | OXYGEN SATURATION: 100 %

## 2025-05-02 DIAGNOSIS — N39.0 UTI (URINARY TRACT INFECTION): ICD-10-CM

## 2025-05-02 DIAGNOSIS — N30.91 HEMORRHAGIC CYSTITIS: Primary | ICD-10-CM

## 2025-05-02 LAB
BACTERIA UR QL AUTO: ABNORMAL /HPF
BILIRUB UR QL STRIP: NEGATIVE
CLARITY UR: ABNORMAL
COLOR UR: ABNORMAL
EXT PREGNANCY TEST URINE: NEGATIVE
EXT. CONTROL: NORMAL
GLUCOSE UR STRIP-MCNC: NEGATIVE MG/DL
HGB UR QL STRIP.AUTO: ABNORMAL
KETONES UR STRIP-MCNC: NEGATIVE MG/DL
LEUKOCYTE ESTERASE UR QL STRIP: ABNORMAL
NITRITE UR QL STRIP: NEGATIVE
NON-SQ EPI CELLS URNS QL MICRO: ABNORMAL /HPF
PH UR STRIP.AUTO: 5.5 [PH]
PROT UR STRIP-MCNC: ABNORMAL MG/DL
RBC #/AREA URNS AUTO: ABNORMAL /HPF
SP GR UR STRIP.AUTO: 1.01 (ref 1–1.03)
UROBILINOGEN UR STRIP-ACNC: <2 MG/DL
WBC #/AREA URNS AUTO: ABNORMAL /HPF

## 2025-05-02 PROCEDURE — 81001 URINALYSIS AUTO W/SCOPE: CPT

## 2025-05-02 PROCEDURE — 81025 URINE PREGNANCY TEST: CPT

## 2025-05-02 PROCEDURE — 87086 URINE CULTURE/COLONY COUNT: CPT

## 2025-05-02 PROCEDURE — 99284 EMERGENCY DEPT VISIT MOD MDM: CPT | Performed by: EMERGENCY MEDICINE

## 2025-05-02 PROCEDURE — 99283 EMERGENCY DEPT VISIT LOW MDM: CPT

## 2025-05-02 RX ORDER — PHENAZOPYRIDINE HYDROCHLORIDE 100 MG/1
200 TABLET, FILM COATED ORAL ONCE
Status: COMPLETED | OUTPATIENT
Start: 2025-05-02 | End: 2025-05-02

## 2025-05-02 RX ORDER — PHENAZOPYRIDINE HYDROCHLORIDE 200 MG/1
200 TABLET, FILM COATED ORAL 2 TIMES DAILY PRN
Qty: 6 TABLET | Refills: 0 | Status: SHIPPED | OUTPATIENT
Start: 2025-05-02 | End: 2025-05-05

## 2025-05-02 RX ORDER — SULFAMETHOXAZOLE AND TRIMETHOPRIM 800; 160 MG/1; MG/1
1 TABLET ORAL 2 TIMES DAILY
Qty: 10 TABLET | Refills: 0 | Status: SHIPPED | OUTPATIENT
Start: 2025-05-02 | End: 2025-05-07

## 2025-05-02 RX ORDER — IBUPROFEN 600 MG/1
600 TABLET, FILM COATED ORAL ONCE
Status: COMPLETED | OUTPATIENT
Start: 2025-05-02 | End: 2025-05-02

## 2025-05-02 RX ORDER — SULFAMETHOXAZOLE AND TRIMETHOPRIM 800; 160 MG/1; MG/1
1 TABLET ORAL ONCE
Status: COMPLETED | OUTPATIENT
Start: 2025-05-02 | End: 2025-05-02

## 2025-05-02 RX ADMIN — PHENAZOPYRIDINE 200 MG: 100 TABLET ORAL at 01:37

## 2025-05-02 RX ADMIN — SULFAMETHOXAZOLE AND TRIMETHOPRIM 1 TABLET: 800; 160 TABLET ORAL at 01:36

## 2025-05-02 RX ADMIN — IBUPROFEN 600 MG: 600 TABLET, FILM COATED ORAL at 01:36

## 2025-05-02 NOTE — ED PROVIDER NOTES
Time reflects when diagnosis was documented in both MDM as applicable and the Disposition within this note       Time User Action Codes Description Comment    5/2/2025  1:09 AM Rashaun Rubio Add [N39.0] UTI (urinary tract infection)     5/2/2025  1:32 AM Rashaun Rubio Add [N30.91] Hemorrhagic cystitis     5/2/2025  1:32 AM Rashaun Rubio Modify [N39.0] UTI (urinary tract infection)     5/2/2025  1:32 AM Rashaun Rubio Modify [N30.91] Hemorrhagic cystitis           ED Disposition       ED Disposition   Discharge    Condition   Stable    Date/Time   Fri May 2, 2025  1:32 AM    Comment   Mary Kay Bailey discharge to home/self care.                   Assessment & Plan       Medical Decision Making  Amount and/or Complexity of Data Reviewed  Labs: ordered. Decision-making details documented in ED Course.    Risk  Prescription drug management.    24-year-old female with no signet past medical history comes in for evaluation of 1 day of pain with urination and hematuria.  The patient states that she is currently sexually active.  She takes birth control but has been missing doses infrequently.  Patient states that she has no trauma to her abdomen and has not had any fever chills nausea vomiting constipation or diarrhea.    On examination the patient's heart and lungs clear to auscultation abdomen tender over the suprapubic abdomen no right upper quadrant tenderness neurologically intact    Impression UTI, hemorrhagic cystitis  Doubt ovarian pathology  Plan: Urine Preg and urinalysis with reflex to microscopy and culture  Urine Prag negative  Patient symptomatic with elevated leuks.  Patient will be started on Bactrim and be given Pyridium as needed for the pain.    The patient was discharged with recommendation follow-up with her PCP or come back she has any concerning symptoms for worsening symptoms  ED Course as of 05/02/25 0221   Fri May 02, 2025   0108 Clarity, UA: Extra Turbid   0108 Leukocytes, UA(!): Large   0214  Patient ua indeterminate but with symptoms will treat with bactrim bid x5 days       Medications   phenazopyridine (PYRIDIUM) tablet 200 mg (200 mg Oral Given 5/2/25 0137)   ibuprofen (MOTRIN) tablet 600 mg (600 mg Oral Given 5/2/25 0136)   sulfamethoxazole-trimethoprim (BACTRIM DS) 800-160 mg per tablet 1 tablet (1 tablet Oral Given 5/2/25 0136)       ED Risk Strat Scores                    No data recorded                            History of Present Illness       Chief Complaint   Patient presents with    Possible UTI     Patient reports having pain with urination, also reports blood in urine.  Symptoms present x 1 day       Past Medical History:   Diagnosis Date    Abnormal Pap smear of cervix     12/2022 LSIL pap; 6/2024 LSIL pap      Past Surgical History:   Procedure Laterality Date    WISDOM TOOTH EXTRACTION  2019      Family History   Problem Relation Age of Onset    Hypertension Maternal Grandmother     Hyperlipidemia Maternal Grandmother     Hypertension Paternal Grandmother     Hyperlipidemia Paternal Grandmother     Breast cancer Neg Hx     Cancer Neg Hx     Colon cancer Neg Hx     Ovarian cancer Neg Hx       Social History     Tobacco Use    Smoking status: Never    Smokeless tobacco: Never   Vaping Use    Vaping status: Never Used   Substance Use Topics    Alcohol use: Yes     Comment: couple times/month    Drug use: Not Currently     Types: Marijuana     Comment: last used marijuana 3/2021      E-Cigarette/Vaping    E-Cigarette Use Never User       E-Cigarette/Vaping Substances    Nicotine No     THC No     CBD No     Flavoring No     Other No     Unknown No       I have reviewed and agree with the history as documented.     24-year-old female comes in with dysuria and hematuria        Review of Systems   Constitutional:  Negative for chills and fever.   HENT:  Negative for ear pain and sore throat.    Eyes:  Negative for pain and visual disturbance.   Respiratory:  Negative for cough and shortness  of breath.    Cardiovascular:  Negative for chest pain and palpitations.   Gastrointestinal:  Negative for abdominal pain and vomiting.   Genitourinary:  Positive for dysuria and hematuria.   Musculoskeletal:  Negative for arthralgias and back pain.   Skin:  Negative for color change and rash.   Neurological:  Negative for seizures and syncope.   All other systems reviewed and are negative.          Objective       ED Triage Vitals [05/02/25 0048]   Temperature Pulse Blood Pressure Respirations SpO2 Patient Position - Orthostatic VS   98 °F (36.7 °C) 76 128/73 20 100 % --      Temp Source Heart Rate Source BP Location FiO2 (%) Pain Score    Oral -- -- -- 8      Vitals      Date and Time Temp Pulse SpO2 Resp BP Pain Score FACES Pain Rating User   05/02/25 0048 98 °F (36.7 °C) 76 100 % 20 128/73 8 -- KG            Physical Exam  Vitals and nursing note reviewed.   Constitutional:       General: She is not in acute distress.     Appearance: She is well-developed.   HENT:      Head: Normocephalic and atraumatic.      Mouth/Throat:      Pharynx: No posterior oropharyngeal erythema.   Eyes:      Extraocular Movements: Extraocular movements intact.      Conjunctiva/sclera: Conjunctivae normal.      Pupils: Pupils are equal, round, and reactive to light.   Cardiovascular:      Rate and Rhythm: Normal rate and regular rhythm.      Heart sounds: No murmur heard.  Pulmonary:      Effort: Pulmonary effort is normal. No respiratory distress.      Breath sounds: Normal breath sounds.   Abdominal:      General: Abdomen is flat. Bowel sounds are normal. There is no distension.      Palpations: Abdomen is soft. There is no shifting dullness, hepatomegaly or mass.      Tenderness: There is abdominal tenderness in the suprapubic area. There is no guarding or rebound.   Musculoskeletal:         General: No swelling.      Cervical back: Neck supple.   Skin:     General: Skin is warm and dry.      Capillary Refill: Capillary refill takes  less than 2 seconds.      Coloration: Skin is not pale.      Findings: No erythema or rash.   Neurological:      Mental Status: She is alert and oriented to person, place, and time.      Cranial Nerves: No cranial nerve deficit.      Motor: No weakness.      Gait: Gait normal.   Psychiatric:         Mood and Affect: Mood normal.         Results Reviewed       Procedure Component Value Units Date/Time    Urine Microscopic [[id number]]  (Abnormal) Collected: 25    Lab Status: Final result Specimen: Urine, Clean Catch Updated: 25     RBC, UA Innumerable /hpf      WBC, UA Innumerable /hpf      Epithelial Cells Occasional /hpf      Bacteria, UA None Seen /hpf     Urine culture [[id number]] Collected: 25    Lab Status: In process Specimen: Urine, Clean Catch Updated: 25    UA w Reflex to Microscopic w Reflex to Culture [[id number]]  (Abnormal) Collected: 25    Lab Status: Final result Specimen: Urine, Clean Catch Updated: 25     Color, UA Light Orange     Clarity, UA Extra Turbid     Specific Gravity, UA 1.010     pH, UA 5.5     Leukocytes, UA Large     Nitrite, UA Negative     Protein,  (2+) mg/dl      Glucose, UA Negative mg/dl      Ketones, UA Negative mg/dl      Urobilinogen, UA <2.0 mg/dl      Bilirubin, UA Negative     Occult Blood, UA Large    POCT pregnancy, urine [[id number]]  (Normal) Collected: 25    Lab Status: Final result Updated: 25     EXT Preg Test, Ur Negative     Control Valid            No orders to display       Procedures    ED Medication and Procedure Management   Prior to Admission Medications   Prescriptions Last Dose Informant Patient Reported? Taking?   Multiple Vitamin (multivitamin) tablet  Self Yes No   Sig: Take 1 tablet by mouth daily   etonogestrel (NEXPLANON) subdermal implant   No No   Si mg by Subdermal route once for 1 dose   methylPREDNISolone 4 MG tablet therapy pack   No No   Sig: Use as  directed on package   norgestimate-ethinyl estradiol (Karen) 0.25-35 MG-MCG per tablet   No No   Sig: Take 1 tablet by mouth daily   omeprazole (PriLOSEC) 40 MG capsule   No No   Sig: Take 1 capsule (40 mg total) by mouth 2 (two) times a day      Facility-Administered Medications: None     Discharge Medication List as of 5/2/2025  1:33 AM        START taking these medications    Details   phenazopyridine (PYRIDIUM) 200 mg tablet Take 1 tablet (200 mg total) by mouth 2 (two) times a day as needed for bladder spasms for up to 3 days, Starting Fri 5/2/2025, Until Mon 5/5/2025 at 2359, Normal      sulfamethoxazole-trimethoprim (BACTRIM DS) 800-160 mg per tablet Take 1 tablet by mouth 2 (two) times a day for 5 days smx-tmp DS (BACTRIM) 800-160 mg tabs (1tab q12 D10), Starting Fri 5/2/2025, Until Wed 5/7/2025, Normal           CONTINUE these medications which have NOT CHANGED    Details   etonogestrel (NEXPLANON) subdermal implant 68 mg by Subdermal route once for 1 dose, Starting Wed 5/19/2021, No Print      methylPREDNISolone 4 MG tablet therapy pack Use as directed on package, Normal      Multiple Vitamin (multivitamin) tablet Take 1 tablet by mouth daily, Historical Med      norgestimate-ethinyl estradiol (Karen) 0.25-35 MG-MCG per tablet Take 1 tablet by mouth daily, Starting Tue 6/11/2024, Normal      omeprazole (PriLOSEC) 40 MG capsule Take 1 capsule (40 mg total) by mouth 2 (two) times a day, Starting Tue 11/12/2024, Normal           No discharge procedures on file.  ED SEPSIS DOCUMENTATION   Time reflects when diagnosis was documented in both MDM as applicable and the Disposition within this note       Time User Action Codes Description Comment    5/2/2025  1:09 AM Rashaun Rubio Add [N39.0] UTI (urinary tract infection)     5/2/2025  1:32 AM Rashaun Rubio Add [N30.91] Hemorrhagic cystitis     5/2/2025  1:32 AM Rashaun Rubio Modify [N39.0] UTI (urinary tract infection)     5/2/2025  1:32 AM Rashaun Rubio Modify  [N30.91] Hemorrhagic cystitis                  Rashaun Rubio MD  05/02/25 0229

## 2025-05-02 NOTE — DISCHARGE INSTRUCTIONS
Please take the prescribed antibiotic twice a day for 5 days. You may use the pyridium twice a day as needed for 2-3 days if you continue to have pain. Please follow up with your pcp and come back if you have any worsening symptoms

## 2025-05-02 NOTE — ED ATTENDING ATTESTATION
5/2/2025  IConrad MD, saw and evaluated the patient. I have discussed the patient with the resident/non-physician practitioner and agree with the resident's/non-physician practitioner's findings, Plan of Care, and MDM as documented in the resident's/non-physician practitioner's note, except where noted. All available labs and Radiology studies were reviewed.  I was present for key portions of any procedure(s) performed by the resident/non-physician practitioner and I was immediately available to provide assistance.       At this point I agree with the current assessment done in the Emergency Department.  I have conducted an independent evaluation of this patient a history and physical is as follows:      Final Diagnosis:  1. Hemorrhagic cystitis    2. UTI (urinary tract infection)      Chief Complaint   Patient presents with    Possible UTI     Patient reports having pain with urination, also reports blood in urine.  Symptoms present x 1 day           A:  - 24-year-old female presents with dysuria and hematuria.      P:  - Urinalysis to evaluate for UTI.  - Urine pregnancy in case the patient is pregnant.  - Pyridium and Motrin for pain.      H:    24-year-old female presents with dysuria and hematuria.  Symptoms started this morning.  Describes painful urination with a little bit of blood.  Does have a history of UTI.      PMH:  Past Medical History:   Diagnosis Date    Abnormal Pap smear of cervix     12/2022 LSIL pap; 6/2024 LSIL pap       PSH:  Past Surgical History:   Procedure Laterality Date    WISDOM TOOTH EXTRACTION  2019         PE:   Vitals:    05/02/25 0048   BP: 128/73   Pulse: 76   Resp: 20   Temp: 98 °F (36.7 °C)   TempSrc: Oral   SpO2: 100%         Constitutional: Vital signs are normal. She appears well-developed. She is cooperative. No distress.   Cardiovascular: Normal rate, regular rhythm.  Pulmonary/Chest: Effort normal.   Abdominal: Soft. Normal appearance.  No abdominal  tenderness.  Neurological: She is alert.   Skin: Skin is warm, dry and intact.   Psychiatric: She has a normal mood and affect. Her speech is normal and behavior is normal. Thought content normal.          - 13 point ROS was performed and all are normal unless stated in the history above.   - Nursing note reviewed. Vitals reviewed.   - Orders placed by myself and/or advanced practitioner / resident.    - Previous chart was reviewed  - No language barrier.   - History obtained from patient.   - There are no limitations to the history obtained. Reasons ROS could not be obtained:  N/A         Medications   phenazopyridine (PYRIDIUM) tablet 200 mg (has no administration in time range)   ibuprofen (MOTRIN) tablet 600 mg (600 mg Oral Given 5/2/25 0136)   sulfamethoxazole-trimethoprim (BACTRIM DS) 800-160 mg per tablet 1 tablet (1 tablet Oral Given 5/2/25 0136)     No orders to display     Orders Placed This Encounter   Procedures    Urine culture    UA w Reflex to Microscopic w Reflex to Culture    Urine Microscopic    POCT pregnancy, urine     Labs Reviewed   UA W REFLEX TO MICROSCOPIC WITH REFLEX TO CULTURE - Abnormal       Result Value Ref Range Status    Color, UA Light Orange   Final    Clarity, UA Extra Turbid   Final    Specific Gravity, UA 1.010  1.003 - 1.030 Final    pH, UA 5.5  4.5, 5.0, 5.5, 6.0, 6.5, 7.0, 7.5, 8.0 Final    Leukocytes, UA Large (*) Negative Final    Nitrite, UA Negative  Negative Final    Protein,  (2+) (*) Negative mg/dl Final    Glucose, UA Negative  Negative mg/dl Final    Ketones, UA Negative  Negative mg/dl Final    Urobilinogen, UA <2.0  <2.0 mg/dl mg/dl Final    Bilirubin, UA Negative  Negative Final    Occult Blood, UA Large (*) Negative Final   URINE MICROSCOPIC - Abnormal    RBC, UA Innumerable (*) None Seen, 1-2 /hpf Final    WBC, UA Innumerable (*) None Seen, 1-2 /hpf Final    Epithelial Cells Occasional  None Seen, Occasional /hpf Final    Bacteria, UA None Seen  None Seen,  Occasional /hpf Final   POCT PREGNANCY, URINE - Normal    EXT Preg Test, Ur Negative   Final    Control Valid   Final   URINE CULTURE     Time reflects when diagnosis was documented in both MDM as applicable and the Disposition within this note       Time User Action Codes Description Comment    2025  1:09 AM Rashaun Rubio Add [N39.0] UTI (urinary tract infection)     2025  1:32 AM Rashaun Rubio Add [N30.91] Hemorrhagic cystitis     2025  1:32 AM Rashaun Rubio Modify [N39.0] UTI (urinary tract infection)     2025  1:32 AM Rashaun Rubio Modify [N30.91] Hemorrhagic cystitis           ED Disposition       ED Disposition   Discharge    Condition   Stable    Date/Time   Fri May 2, 2025  1:32 AM    Comment   Mary Kay Bailey discharge to home/self care.                   Follow-up Information    None       Patient's Medications   Discharge Prescriptions    PHENAZOPYRIDINE (PYRIDIUM) 200 MG TABLET    Take 1 tablet (200 mg total) by mouth 2 (two) times a day as needed for bladder spasms for up to 3 days       Start Date: 2025  End Date: 2025       Order Dose: 200 mg       Quantity: 6 tablet    Refills: 0    SULFAMETHOXAZOLE-TRIMETHOPRIM (BACTRIM DS) 800-160 MG PER TABLET    Take 1 tablet by mouth 2 (two) times a day for 5 days smx-tmp DS (BACTRIM) 800-160 mg tabs (1tab q12 D10)       Start Date: 2025  End Date: 2025       Order Dose: 1 tablet       Quantity: 10 tablet    Refills: 0     No discharge procedures on file.  Prior to Admission Medications   Prescriptions Last Dose Informant Patient Reported? Taking?   Multiple Vitamin (multivitamin) tablet  Self Yes No   Sig: Take 1 tablet by mouth daily   etonogestrel (NEXPLANON) subdermal implant   No No   Si mg by Subdermal route once for 1 dose   methylPREDNISolone 4 MG tablet therapy pack   No No   Sig: Use as directed on package   norgestimate-ethinyl estradiol (Karen) 0.25-35 MG-MCG per tablet   No No   Sig: Take 1 tablet by mouth daily  "  omeprazole (PriLOSEC) 40 MG capsule   No No   Sig: Take 1 capsule (40 mg total) by mouth 2 (two) times a day      Facility-Administered Medications: None       Portions of the record may have been created with voice recognition software. Occasional wrong word or \"sound a like\" substitutions may have occurred due to the inherent limitations of voice recognition software. Read the chart carefully and recognize, using context, where substitutions have occurred.       ED Course         Critical Care Time  Procedures      "

## 2025-05-03 LAB — BACTERIA UR CULT: NORMAL

## 2025-06-16 DIAGNOSIS — N93.9 ABNORMAL UTERINE BLEEDING (AUB): ICD-10-CM

## 2025-06-18 RX ORDER — NORGESTIMATE AND ETHINYL ESTRADIOL 0.25-0.035
1 KIT ORAL DAILY
Qty: 28 TABLET | Refills: 0 | Status: SHIPPED | OUTPATIENT
Start: 2025-06-18

## 2025-06-18 NOTE — TELEPHONE ENCOUNTER
Called PT and left voice message to contact our office to schedule a annual and only 1 refill will be sent.

## 2025-06-18 NOTE — TELEPHONE ENCOUNTER
I will give her 1 month refill.  She needs to schedule annual GYN exam w/me.  Please assist her with scheduling.